# Patient Record
Sex: FEMALE | Race: WHITE | NOT HISPANIC OR LATINO | Employment: UNEMPLOYED | URBAN - METROPOLITAN AREA
[De-identification: names, ages, dates, MRNs, and addresses within clinical notes are randomized per-mention and may not be internally consistent; named-entity substitution may affect disease eponyms.]

---

## 2021-04-18 ENCOUNTER — APPOINTMENT (OUTPATIENT)
Dept: RADIOLOGY | Facility: MEDICAL CENTER | Age: 61
DRG: 918 | End: 2021-04-18
Attending: EMERGENCY MEDICINE
Payer: COMMERCIAL

## 2021-04-18 ENCOUNTER — HOSPITAL ENCOUNTER (INPATIENT)
Facility: MEDICAL CENTER | Age: 61
LOS: 4 days | DRG: 918 | End: 2021-04-22
Attending: EMERGENCY MEDICINE | Admitting: STUDENT IN AN ORGANIZED HEALTH CARE EDUCATION/TRAINING PROGRAM
Payer: COMMERCIAL

## 2021-04-18 DIAGNOSIS — D61.818 PANCYTOPENIA (HCC): ICD-10-CM

## 2021-04-18 DIAGNOSIS — T46.5X2A CLONIDINE OVERDOSE, INTENTIONAL SELF-HARM, INITIAL ENCOUNTER (HCC): ICD-10-CM

## 2021-04-18 DIAGNOSIS — F10.920 ALCOHOLIC INTOXICATION WITHOUT COMPLICATION (HCC): ICD-10-CM

## 2021-04-18 LAB
ALBUMIN SERPL BCP-MCNC: 3.8 G/DL (ref 3.2–4.9)
ALBUMIN/GLOB SERPL: 1.2 G/DL
ALP SERPL-CCNC: 94 U/L (ref 30–99)
ALT SERPL-CCNC: 20 U/L (ref 2–50)
AMPHET UR QL SCN: NEGATIVE
ANION GAP SERPL CALC-SCNC: 11 MMOL/L (ref 7–16)
APAP SERPL-MCNC: <5 UG/ML (ref 10–30)
AST SERPL-CCNC: 55 U/L (ref 12–45)
BARBITURATES UR QL SCN: NEGATIVE
BASOPHILS # BLD AUTO: 0.9 % (ref 0–1.8)
BASOPHILS # BLD: 0.02 K/UL (ref 0–0.12)
BENZODIAZ UR QL SCN: POSITIVE
BILIRUB SERPL-MCNC: 0.6 MG/DL (ref 0.1–1.5)
BUN SERPL-MCNC: 10 MG/DL (ref 8–22)
BZE UR QL SCN: NEGATIVE
CALCIUM SERPL-MCNC: 8.7 MG/DL (ref 8.5–10.5)
CANNABINOIDS UR QL SCN: NEGATIVE
CHLORIDE SERPL-SCNC: 106 MMOL/L (ref 96–112)
CO2 SERPL-SCNC: 25 MMOL/L (ref 20–33)
CREAT SERPL-MCNC: 0.55 MG/DL (ref 0.5–1.4)
EKG IMPRESSION: NORMAL
EOSINOPHIL # BLD AUTO: 0.05 K/UL (ref 0–0.51)
EOSINOPHIL NFR BLD: 2.3 % (ref 0–6.9)
ERYTHROCYTE [DISTWIDTH] IN BLOOD BY AUTOMATED COUNT: 52.3 FL (ref 35.9–50)
ETHANOL BLD-MCNC: 197.8 MG/DL (ref 0–10)
GLOBULIN SER CALC-MCNC: 3.3 G/DL (ref 1.9–3.5)
GLUCOSE SERPL-MCNC: 129 MG/DL (ref 65–99)
HCT VFR BLD AUTO: 29.8 % (ref 37–47)
HGB BLD-MCNC: 9.3 G/DL (ref 12–16)
IMM GRANULOCYTES # BLD AUTO: 0.01 K/UL (ref 0–0.11)
IMM GRANULOCYTES NFR BLD AUTO: 0.5 % (ref 0–0.9)
LIPASE SERPL-CCNC: 67 U/L (ref 11–82)
LYMPHOCYTES # BLD AUTO: 0.6 K/UL (ref 1–4.8)
LYMPHOCYTES NFR BLD: 27.6 % (ref 22–41)
MAGNESIUM SERPL-MCNC: 1.7 MG/DL (ref 1.5–2.5)
MCH RBC QN AUTO: 27.5 PG (ref 27–33)
MCHC RBC AUTO-ENTMCNC: 31.2 G/DL (ref 33.6–35)
MCV RBC AUTO: 88.2 FL (ref 81.4–97.8)
METHADONE UR QL SCN: NEGATIVE
MONOCYTES # BLD AUTO: 0.22 K/UL (ref 0–0.85)
MONOCYTES NFR BLD AUTO: 10.1 % (ref 0–13.4)
NEUTROPHILS # BLD AUTO: 1.27 K/UL (ref 2–7.15)
NEUTROPHILS NFR BLD: 58.6 % (ref 44–72)
NRBC # BLD AUTO: 0 K/UL
NRBC BLD-RTO: 0 /100 WBC
OPIATES UR QL SCN: NEGATIVE
OXYCODONE UR QL SCN: NEGATIVE
PCP UR QL SCN: NEGATIVE
PLATELET # BLD AUTO: 108 K/UL (ref 164–446)
PMV BLD AUTO: 10.8 FL (ref 9–12.9)
POTASSIUM SERPL-SCNC: 3.5 MMOL/L (ref 3.6–5.5)
PROPOXYPH UR QL SCN: NEGATIVE
PROT SERPL-MCNC: 7.1 G/DL (ref 6–8.2)
RBC # BLD AUTO: 3.38 M/UL (ref 4.2–5.4)
SALICYLATES SERPL-MCNC: <1 MG/DL (ref 15–25)
SARS-COV-2 RNA RESP QL NAA+PROBE: NOTDETECTED
SODIUM SERPL-SCNC: 142 MMOL/L (ref 135–145)
SPECIMEN SOURCE: NORMAL
TSH SERPL DL<=0.005 MIU/L-ACNC: 2.59 UIU/ML (ref 0.38–5.33)
WBC # BLD AUTO: 2.2 K/UL (ref 4.8–10.8)

## 2021-04-18 PROCEDURE — 99291 CRITICAL CARE FIRST HOUR: CPT | Performed by: EMERGENCY MEDICINE

## 2021-04-18 PROCEDURE — A9270 NON-COVERED ITEM OR SERVICE: HCPCS | Performed by: STUDENT IN AN ORGANIZED HEALTH CARE EDUCATION/TRAINING PROGRAM

## 2021-04-18 PROCEDURE — 80179 DRUG ASSAY SALICYLATE: CPT

## 2021-04-18 PROCEDURE — U0005 INFEC AGEN DETEC AMPLI PROBE: HCPCS

## 2021-04-18 PROCEDURE — 82077 ASSAY SPEC XCP UR&BREATH IA: CPT

## 2021-04-18 PROCEDURE — 99291 CRITICAL CARE FIRST HOUR: CPT

## 2021-04-18 PROCEDURE — 700105 HCHG RX REV CODE 258: Performed by: STUDENT IN AN ORGANIZED HEALTH CARE EDUCATION/TRAINING PROGRAM

## 2021-04-18 PROCEDURE — 770022 HCHG ROOM/CARE - ICU (200)

## 2021-04-18 PROCEDURE — 83735 ASSAY OF MAGNESIUM: CPT

## 2021-04-18 PROCEDURE — 700101 HCHG RX REV CODE 250: Performed by: INTERNAL MEDICINE

## 2021-04-18 PROCEDURE — 99292 CRITICAL CARE ADDL 30 MIN: CPT | Performed by: INTERNAL MEDICINE

## 2021-04-18 PROCEDURE — 96374 THER/PROPH/DIAG INJ IV PUSH: CPT

## 2021-04-18 PROCEDURE — 71045 X-RAY EXAM CHEST 1 VIEW: CPT

## 2021-04-18 PROCEDURE — 700101 HCHG RX REV CODE 250: Performed by: EMERGENCY MEDICINE

## 2021-04-18 PROCEDURE — 94664 DEMO&/EVAL PT USE INHALER: CPT

## 2021-04-18 PROCEDURE — 99406 BEHAV CHNG SMOKING 3-10 MIN: CPT

## 2021-04-18 PROCEDURE — U0003 INFECTIOUS AGENT DETECTION BY NUCLEIC ACID (DNA OR RNA); SEVERE ACUTE RESPIRATORY SYNDROME CORONAVIRUS 2 (SARS-COV-2) (CORONAVIRUS DISEASE [COVID-19]), AMPLIFIED PROBE TECHNIQUE, MAKING USE OF HIGH THROUGHPUT TECHNOLOGIES AS DESCRIBED BY CMS-2020-01-R: HCPCS

## 2021-04-18 PROCEDURE — 80143 DRUG ASSAY ACETAMINOPHEN: CPT

## 2021-04-18 PROCEDURE — 84443 ASSAY THYROID STIM HORMONE: CPT

## 2021-04-18 PROCEDURE — 93005 ELECTROCARDIOGRAM TRACING: CPT | Performed by: EMERGENCY MEDICINE

## 2021-04-18 PROCEDURE — 85025 COMPLETE CBC W/AUTO DIFF WBC: CPT

## 2021-04-18 PROCEDURE — 700111 HCHG RX REV CODE 636 W/ 250 OVERRIDE (IP): Performed by: STUDENT IN AN ORGANIZED HEALTH CARE EDUCATION/TRAINING PROGRAM

## 2021-04-18 PROCEDURE — 700111 HCHG RX REV CODE 636 W/ 250 OVERRIDE (IP): Performed by: INTERNAL MEDICINE

## 2021-04-18 PROCEDURE — 99223 1ST HOSP IP/OBS HIGH 75: CPT | Performed by: PSYCHIATRY & NEUROLOGY

## 2021-04-18 PROCEDURE — 70450 CT HEAD/BRAIN W/O DYE: CPT

## 2021-04-18 PROCEDURE — 700111 HCHG RX REV CODE 636 W/ 250 OVERRIDE (IP)

## 2021-04-18 PROCEDURE — HZ2ZZZZ DETOXIFICATION SERVICES FOR SUBSTANCE ABUSE TREATMENT: ICD-10-PCS | Performed by: STUDENT IN AN ORGANIZED HEALTH CARE EDUCATION/TRAINING PROGRAM

## 2021-04-18 PROCEDURE — 80307 DRUG TEST PRSMV CHEM ANLYZR: CPT

## 2021-04-18 PROCEDURE — 700102 HCHG RX REV CODE 250 W/ 637 OVERRIDE(OP): Performed by: STUDENT IN AN ORGANIZED HEALTH CARE EDUCATION/TRAINING PROGRAM

## 2021-04-18 PROCEDURE — 99233 SBSQ HOSP IP/OBS HIGH 50: CPT | Performed by: STUDENT IN AN ORGANIZED HEALTH CARE EDUCATION/TRAINING PROGRAM

## 2021-04-18 PROCEDURE — 80053 COMPREHEN METABOLIC PANEL: CPT

## 2021-04-18 PROCEDURE — 83690 ASSAY OF LIPASE: CPT

## 2021-04-18 PROCEDURE — 51798 US URINE CAPACITY MEASURE: CPT

## 2021-04-18 RX ORDER — HYDRALAZINE HYDROCHLORIDE 20 MG/ML
20 INJECTION INTRAMUSCULAR; INTRAVENOUS EVERY 6 HOURS PRN
Status: DISCONTINUED | OUTPATIENT
Start: 2021-04-18 | End: 2021-04-22 | Stop reason: HOSPADM

## 2021-04-18 RX ORDER — GAUZE BANDAGE 2" X 2"
100 BANDAGE TOPICAL DAILY
Status: COMPLETED | OUTPATIENT
Start: 2021-04-19 | End: 2021-04-22

## 2021-04-18 RX ORDER — LABETALOL HYDROCHLORIDE 5 MG/ML
10 INJECTION, SOLUTION INTRAVENOUS EVERY 6 HOURS PRN
Status: DISCONTINUED | OUTPATIENT
Start: 2021-04-18 | End: 2021-04-22 | Stop reason: HOSPADM

## 2021-04-18 RX ORDER — LORAZEPAM 2 MG/ML
0.5 INJECTION INTRAMUSCULAR EVERY 4 HOURS
Status: DISCONTINUED | OUTPATIENT
Start: 2021-04-18 | End: 2021-04-19

## 2021-04-18 RX ORDER — SODIUM CHLORIDE, SODIUM LACTATE, POTASSIUM CHLORIDE, AND CALCIUM CHLORIDE .6; .31; .03; .02 G/100ML; G/100ML; G/100ML; G/100ML
1000 INJECTION, SOLUTION INTRAVENOUS ONCE
Status: COMPLETED | OUTPATIENT
Start: 2021-04-18 | End: 2021-04-18

## 2021-04-18 RX ORDER — SODIUM CHLORIDE, SODIUM LACTATE, POTASSIUM CHLORIDE, CALCIUM CHLORIDE 600; 310; 30; 20 MG/100ML; MG/100ML; MG/100ML; MG/100ML
INJECTION, SOLUTION INTRAVENOUS CONTINUOUS
Status: DISCONTINUED | OUTPATIENT
Start: 2021-04-18 | End: 2021-04-20

## 2021-04-18 RX ORDER — NALOXONE HYDROCHLORIDE 0.4 MG/ML
0.4 INJECTION, SOLUTION INTRAMUSCULAR; INTRAVENOUS; SUBCUTANEOUS ONCE
Status: COMPLETED | OUTPATIENT
Start: 2021-04-18 | End: 2021-04-18

## 2021-04-18 RX ORDER — FOLIC ACID 1 MG/1
1 TABLET ORAL DAILY
Status: COMPLETED | OUTPATIENT
Start: 2021-04-19 | End: 2021-04-22

## 2021-04-18 RX ORDER — LORAZEPAM 2 MG/ML
1-2 INJECTION INTRAMUSCULAR
Status: DISCONTINUED | OUTPATIENT
Start: 2021-04-18 | End: 2021-04-19

## 2021-04-18 RX ORDER — POLYETHYLENE GLYCOL 3350 17 G/17G
1 POWDER, FOR SOLUTION ORAL
Status: DISCONTINUED | OUTPATIENT
Start: 2021-04-18 | End: 2021-04-22 | Stop reason: HOSPADM

## 2021-04-18 RX ORDER — BISACODYL 10 MG
10 SUPPOSITORY, RECTAL RECTAL
Status: DISCONTINUED | OUTPATIENT
Start: 2021-04-18 | End: 2021-04-22 | Stop reason: HOSPADM

## 2021-04-18 RX ORDER — NALOXONE HYDROCHLORIDE 0.4 MG/ML
INJECTION, SOLUTION INTRAMUSCULAR; INTRAVENOUS; SUBCUTANEOUS
Status: COMPLETED
Start: 2021-04-18 | End: 2021-04-18

## 2021-04-18 RX ORDER — ACETAMINOPHEN 325 MG/1
650 TABLET ORAL EVERY 6 HOURS PRN
Status: DISCONTINUED | OUTPATIENT
Start: 2021-04-18 | End: 2021-04-22 | Stop reason: HOSPADM

## 2021-04-18 RX ORDER — AMOXICILLIN 250 MG
2 CAPSULE ORAL 2 TIMES DAILY
Status: DISCONTINUED | OUTPATIENT
Start: 2021-04-18 | End: 2021-04-22 | Stop reason: HOSPADM

## 2021-04-18 RX ORDER — POTASSIUM CHLORIDE 20 MEQ/1
40 TABLET, EXTENDED RELEASE ORAL EVERY 6 HOURS
Status: COMPLETED | OUTPATIENT
Start: 2021-04-18 | End: 2021-04-18

## 2021-04-18 RX ADMIN — SODIUM CHLORIDE, POTASSIUM CHLORIDE, SODIUM LACTATE AND CALCIUM CHLORIDE 125 ML: 600; 310; 30; 20 INJECTION, SOLUTION INTRAVENOUS at 04:36

## 2021-04-18 RX ADMIN — LORAZEPAM 1 MG: 2 INJECTION INTRAMUSCULAR; INTRAVENOUS at 20:26

## 2021-04-18 RX ADMIN — SODIUM CHLORIDE, POTASSIUM CHLORIDE, SODIUM LACTATE AND CALCIUM CHLORIDE 1000 ML: 600; 310; 30; 20 INJECTION, SOLUTION INTRAVENOUS at 03:33

## 2021-04-18 RX ADMIN — NALOXONE HYDROCHLORIDE 0.4 MG: 0.4 INJECTION, SOLUTION INTRAMUSCULAR; INTRAVENOUS; SUBCUTANEOUS at 01:01

## 2021-04-18 RX ADMIN — ENOXAPARIN SODIUM 40 MG: 40 INJECTION SUBCUTANEOUS at 05:02

## 2021-04-18 RX ADMIN — SODIUM CHLORIDE, POTASSIUM CHLORIDE, SODIUM LACTATE AND CALCIUM CHLORIDE: 600; 310; 30; 20 INJECTION, SOLUTION INTRAVENOUS at 20:43

## 2021-04-18 RX ADMIN — THIAMINE HYDROCHLORIDE: 100 INJECTION, SOLUTION INTRAMUSCULAR; INTRAVENOUS at 05:50

## 2021-04-18 RX ADMIN — POTASSIUM CHLORIDE 40 MEQ: 1500 TABLET, EXTENDED RELEASE ORAL at 05:02

## 2021-04-18 RX ADMIN — LORAZEPAM 0.5 MG: 2 INJECTION INTRAMUSCULAR; INTRAVENOUS at 10:42

## 2021-04-18 RX ADMIN — LABETALOL HYDROCHLORIDE 10 MG: 5 INJECTION, SOLUTION INTRAVENOUS at 17:43

## 2021-04-18 RX ADMIN — ACETAMINOPHEN 650 MG: 325 TABLET, FILM COATED ORAL at 16:10

## 2021-04-18 RX ADMIN — LORAZEPAM 0.5 MG: 2 INJECTION INTRAMUSCULAR; INTRAVENOUS at 14:00

## 2021-04-18 RX ADMIN — LORAZEPAM 1 MG: 2 INJECTION INTRAMUSCULAR; INTRAVENOUS at 16:09

## 2021-04-18 RX ADMIN — POTASSIUM CHLORIDE 40 MEQ: 1500 TABLET, EXTENDED RELEASE ORAL at 14:00

## 2021-04-18 ASSESSMENT — ENCOUNTER SYMPTOMS
PALPITATIONS: 0
CONSTIPATION: 0
COUGH: 0
RESPIRATORY NEGATIVE: 1
BLURRED VISION: 1
SHORTNESS OF BREATH: 0
WEIGHT LOSS: 1
WEAKNESS: 0
HEARTBURN: 1
DOUBLE VISION: 0
VOMITING: 0
NAUSEA: 0
MEMORY LOSS: 0
ABDOMINAL PAIN: 0
MYALGIAS: 0
MUSCULOSKELETAL NEGATIVE: 1
WEIGHT LOSS: 0
BLURRED VISION: 0
SORE THROAT: 0
NERVOUS/ANXIOUS: 1
DEPRESSION: 1
CHILLS: 0
DIZZINESS: 0
FALLS: 0
DIARRHEA: 1
INSOMNIA: 1
FEVER: 0
HEADACHES: 0
SENSORY CHANGE: 0
CARDIOVASCULAR NEGATIVE: 1
EYES NEGATIVE: 1
FOCAL WEAKNESS: 0
TREMORS: 1
GASTROINTESTINAL NEGATIVE: 1
STRIDOR: 0
NEUROLOGICAL NEGATIVE: 1
SPUTUM PRODUCTION: 0
EYE REDNESS: 1

## 2021-04-18 ASSESSMENT — LIFESTYLE VARIABLES: SUBSTANCE_ABUSE: 1

## 2021-04-18 ASSESSMENT — FIBROSIS 4 INDEX: FIB4 SCORE: 7.06

## 2021-04-18 ASSESSMENT — PAIN DESCRIPTION - PAIN TYPE: TYPE: ACUTE PAIN

## 2021-04-18 NOTE — ASSESSMENT & PLAN NOTE
Suicide attempt via ingestion of clonidine 0.1 mg (10-15 tablets)  BP has been stable and normal thus far.   Monitor for hypotension   May consider narcan infusion if she gets worse  Supportive care

## 2021-04-18 NOTE — PROGRESS NOTES
2 RN Skin Assessment Completed by Michelle VELA RN and Chelsea TANNER RN.    Head: WDL  Ears: redness Blanching  Nose: WDL  Mouth: WDL  Neck: WDL  Breasts/Chest: WDL  Shoulder Blades: WDL  Spine: redness and blanching  (R) Arm/Elbow/hand: bruising  (L) Arm/Elbow/hand: bruising  Abdomen:bruising  Groin: WDL  Sacrum/Coccyx/Buttocks: redness and blanching  (R) Leg: bruising discoloration  (L) Leg: bruising discoloration  (R) Heel/Foot/Toe: discoloration and boggy  (L) Heel/Foot/Toe: discoloration and boggy          Devices in place: BP Cuff and Pulse Ox purewic    Interventions in place: NC with ear foams, Sacral Mepilex, Pillows, Q2 turns, Low air loss mattress , Barrier Cream and Heels floated with pillows    Possible skin injury found: No    Pictures uploaded into Epic: N/A  Wound Consult Placed: N/A

## 2021-04-18 NOTE — ASSESSMENT & PLAN NOTE
As per history has history of scoliosis that affects her ambulation.  lidoderm patch added 4/20  Continue to monitor.  Consider PT and OT consults.

## 2021-04-18 NOTE — ASSESSMENT & PLAN NOTE
History of hypertension.    S/p hypotension from clonidine overdose  Has prn labetalol and hydralazine  If becomes more consistant initiate other oral BP med  monitor vitals.

## 2021-04-18 NOTE — ASSESSMENT & PLAN NOTE
Likely secondary to bone marrow suppression in setting of chronic alcoholism.  Question of liver disease in conjunction, check hepatitis panel  No current signs of infection.  Continue to monitor closely.

## 2021-04-18 NOTE — H&P
"Hospital Medicine History & Physical Note    Date of Service  4/18/2021    Primary Care Physician  No primary care provider on file.    Consultants  Critical Care-Dr. Cat    Code Status  Full Code    Chief Complaint  Chief Complaint   Patient presents with   • ALOC     Possible OD on Clonidine and ETOH per son   • Suicidal Ideation       History of Presenting Illness  61 y.o. female who presented 4/18/2021 with intentional suicide attempt through clonidine overdose.  This is a pleasant woman with a history of alcohol dependence with cirrhosis, COPD not on home oxygen, GERD, heart disease unspecified, hypertension, and scoliosis.  She reports having gone through detox from heroin and methamphetamine 2 months ago but was frustrated with her continued use of alcohol.  She reports having gone through 35 treatment centers between [place] and other [facility], in which she has lived.  She texted her son that she was going to \"end it all\" and took approximately 10 tablets of clonidine while she was drinking.  She reports she did \"something stupid\" but was a intentional attempt at suicide.  She currently reports regretting her atttempt.  She reports that she obtained the clonidine from a recent detoxification and that she was discharged with it.  She also reports history of persistent depression with anxiety.    She was brought in by Medicare 1 was given 1 L of IVFs was Levophed drip on route.  She was noted to be alert and oriented x1 on arrival by EMS.  On arrival here, her blood pressure was 105/63 but continued to trend down to the 80s over 50s becoming more somnolent.  She was hypothermic with a temperature of 95.4.  Blood alcohol was 197.8, negative acetaminophen and salicylates.  Potassium mildly low at 3.5.      Review of Systems  Review of Systems   Constitutional: Negative for chills and fever.   HENT: Negative for ear pain and sore throat.    Eyes: Positive for blurred vision and redness. Negative for double " vision.   Respiratory: Negative for cough and shortness of breath.    Cardiovascular: Negative for chest pain and palpitations.   Gastrointestinal: Positive for diarrhea and heartburn. Negative for abdominal pain, constipation, nausea and vomiting.   Genitourinary: Negative for dysuria and frequency.   Musculoskeletal: Negative for joint pain and myalgias.   Skin: Negative for itching and rash.   Neurological: Negative for dizziness, weakness and headaches.   Psychiatric/Behavioral: Positive for depression, substance abuse (Alcohol) and suicidal ideas. Negative for memory loss. The patient is nervous/anxious and has insomnia.        Past Medical History   has a past medical history of Cirrhosis with alcoholism (HCC), COPD (chronic obstructive pulmonary disease) (HCC), GERD (gastroesophageal reflux disease), Heart disease, Hypertension, and Scoliosis.    Surgical History   has a past surgical history that includes cholecystectomy and primary c section.     Family History  family history includes COPD in her mother; Heart Disease in her father; Kidney Disease in her father.     Social History   reports that she quit smoking about 22 years ago. Her smoking use included cigarettes. She has a 26.25 pack-year smoking history. She has never used smokeless tobacco. She reports current alcohol use. She reports previous drug use.    Allergies  Not on File    Medications  None       Physical Exam  Temp:  [35.2 °C (95.4 °F)] 35.2 °C (95.4 °F)  Pulse:  [55-67] 67  Resp:  [12-20] 14  BP: ()/(51-63) 99/57  SpO2:  [90 %-100 %] 97 %    Physical Exam    Laboratory:  Recent Labs     04/18/21 0205   WBC 2.2*   RBC 3.38*   HEMOGLOBIN 9.3*   HEMATOCRIT 29.8*   MCV 88.2   MCH 27.5   MCHC 31.2*   RDW 52.3*   PLATELETCT 108*   MPV 10.8     Recent Labs     04/18/21  0205   SODIUM 142   POTASSIUM 3.5*   CHLORIDE 106   CO2 25   GLUCOSE 129*   BUN 10   CREATININE 0.55   CALCIUM 8.7     Recent Labs     04/18/21 0205   ALTSGPT 20      ASTSGOT 55*   ALKPHOSPHAT 94   TBILIRUBIN 0.6   LIPASE 67   GLUCOSE 129*         No results for input(s): NTPROBNP in the last 72 hours.      No results for input(s): TROPONINT in the last 72 hours.    Imaging:  CT-HEAD W/O   Final Result         1.  No acute intracranial abnormality is identified, there are nonspecific white matter changes, commonly associated with small vessel ischemic disease.  Associated mild cerebral atrophy is noted.   2.  Hyperdensity in the posterior oropharynx, likely ingested material. Both   3.  Atherosclerosis.      DX-CHEST-PORTABLE (1 VIEW)   Final Result         1.  Interstitial pulmonary parenchymal prominence suggest chronic underlying lung disease, component of interstitial edema and/or infiltrates not excluded.   2.  Cardiomegaly   3.  Atherosclerosis          I have personally reviewed the patient's head CT.  No intracranial hemorrhage or masses.  Possible pill or object in her oropharynx.      I have personally reviewed the patient's chest x-ray.  Per my read fine interstitial infiltrates on the right.  Sharp costophrenic angles bilaterally.  No focal infiltrates.  Mild cardiomegaly.        EKG per my read shows sinus tachycardia with heart rate of 456, QTc 486, no significant ST elevation or depression.    Assessment/Plan:  I anticipate this patient will require at least two midnights for appropriate medical management, necessitating inpatient admission.    * Hypotension due to drugs- (present on admission)  Assessment & Plan  Secondary to clonidine overdose.      I have discussed the case with the ER physician, Dr. Driver, as well as Dr. Cat of Critical Care, who is currently accepted the patient for the ICU.  Patient is critically ill due to her unstable blood pressures.    Admit to ICU.  Additional IV fluid bolus with LR with maintenance rate following.  Neurochecks every 4 hours.  Pressors as per critical care if indicated.    Alcohol dependence with intoxication  ([facility])- (present on admission)  Assessment & Plan  Patient continues to remain frustrated with inability to refrain from alcohol.    Counseled patient on use of supportive groups such as Alcoholics Anonymous.    Suicide attempt by drug overdose ([facility])- (present on admission)  Assessment & Plan  Patient confirmed it was a suicide attempt.    I personally placed the patient on legal hold.    Clonidine overdose- (present on admission)  Assessment & Plan  Ms. Aceves was here with a confirmed overdose of clonidine; she has no other known overdoses.      Continue supportive care in ICU.    Acquired pancytopenia ([facility])- (present on admission)  Assessment & Plan  Likely secondary to bone marrow suppression in setting of chronic alcoholism.    No current signs of infection.  Continue to monitor closely.    Hypokalemia- (present on admission)  Assessment & Plan  Replace for potassium goal greater than 4.0.  Magnesium goal greater than 2.0.    Hypertension- (present on admission)  Assessment & Plan  History of hypertension.  Currently hypotensive due to clonidine overdose.    Cirrhosis with alcoholism ([facility])- (present on admission)  Assessment & Plan  As per history due to continued alcoholism.    Patient counseled on alcohol cessation as per above.    Heart disease- (present on admission)  Assessment & Plan  Unspecified heart disease history per patient.  Mild cardiomegaly present on chest x-ray.  No signs of acute heart failure.    Continue to monitor.  Consider echocardiogram.    COPD (chronic obstructive pulmonary disease) ([facility])- (present on admission)  Assessment & Plan  Currently stable not on inhalers or oxygen.  Quit smoking 12 years ago.    Continue to monitor.    GERD (gastroesophageal reflux disease)- (present on admission)  Assessment & Plan  As per history he has history of GERD.  No current symptoms.    Continue to monitor.    Scoliosis- (present on admission)  Assessment & Plan  As per history has history of  scoliosis that affects her ambulation.    Continue to monitor.  Consider PT and OT consults.

## 2021-04-18 NOTE — ED PROVIDER NOTES
ED Provider Note    CHIEF COMPLAINT  Chief Complaint   Patient presents with   • ALOC     Possible OD on Clonidine and ETOH per son       HPI  Kacie Aceves is a 61 y.o. female who presents to the emergency department after intentional overdose. Patient admits to daily alcohol abuse. Tonight took approximately 10 to 15 tablets of her clonidine with attempt to kill self. Denies any other current complaints. Denies prior history the same. Ingestion roughly around 8 PM.    EMS states the patient was significantly somnolent and also hypotensive and bradycardia. She started on levo. Prior to arrival with slight improvement of vital signs.    REVIEW OF SYSTEMS  See HPI for further details. All other systems are negative.     PAST MEDICAL HISTORY       SOCIAL HISTORY  Social History     Tobacco Use   • Smoking status: Not on file   Substance and Sexual Activity   • Alcohol use: Not on file   • Drug use: Not on file   • Sexual activity: Not on file       SURGICAL HISTORY  patient denies any surgical history    CURRENT MEDICATIONS  Home Medications    **Home medications have not yet been reviewed for this encounter**         ALLERGIES  Not on File    PHYSICAL EXAM  VITAL SIGNS: There were no vitals taken for this visit. @[facility][[id number]::@   Pulse ox interpretation: I interpret this pulse ox as normal.  Constitutional: Alert in no apparent distress.  HENT: No signs of trauma, Bilateral external ears normal, Nose normal.   Eyes: Pupils are pinpoint  Neck: Normal range of motion, No tenderness, Supple, No stridor.   Lymphatic: No lymphadenopathy noted.   Cardiovascular: Regular rate and rhythm, no murmurs.   Thorax & Lungs: Normal breath sounds, No respiratory distress, No wheezing, No chest tenderness.   Abdomen: Bowel sounds normal, Soft, No tenderness  Skin: Warm, Dry, No erythema, No rash.    Extremities: Intact distal pulses, No edema, No tenderness  Musculoskeletal: Good range of motion in all major joints. No tenderness to  palpation or major deformities noted.   Neurologic:  somnolent, arouses to voice, lateral nystagmus,   Psychiatric: suicidal with attempt      DIAGNOSTIC STUDIES / PROCEDURES        LABS  Results for orders placed or performed during the hospital encounter of 04/18/21   URINE DRUG SCREEN (TRIAGE)   Result Value Ref Range    Amphetamines Urine Negative Negative    Barbiturates Negative Negative    Benzodiazepines Positive (A) Negative    Cocaine Metabolite Negative Negative    Methadone Negative Negative    Opiates Negative Negative    Oxycodone Negative Negative    Phencyclidine -Pcp Negative Negative    Propoxyphene Negative Negative    Cannabinoid Metab Negative Negative   Blood Alcohol   Result Value Ref Range    Diagnostic Alcohol 197.8 (H) 0.0 - 10.0 mg/dL   CBC WITH DIFFERENTIAL   Result Value Ref Range    WBC 2.2 (LL) 4.8 - 10.8 K/uL    RBC 3.38 (L) 4.20 - 5.40 M/uL    Hemoglobin 9.3 (L) 12.0 - 16.0 g/dL    Hematocrit 29.8 (L) 37.0 - 47.0 %    MCV 88.2 81.4 - 97.8 fL    MCH 27.5 27.0 - 33.0 pg    MCHC 31.2 (L) 33.6 - 35.0 g/dL    RDW 52.3 (H) 35.9 - 50.0 fL    Platelet Count 108 (L) 164 - 446 K/uL    MPV 10.8 9.0 - 12.9 fL    Neutrophils-Polys 58.60 44.00 - 72.00 %    Lymphocytes 27.60 22.00 - 41.00 %    Monocytes 10.10 0.00 - 13.40 %    Eosinophils 2.30 0.00 - 6.90 %    Basophils 0.90 0.00 - 1.80 %    Immature Granulocytes 0.50 0.00 - 0.90 %    Nucleated RBC 0.00 /100 WBC    Neutrophils (Absolute) 1.27 (L) 2.00 - 7.15 K/uL    Lymphs (Absolute) 0.60 (L) 1.00 - 4.80 K/uL    Monos (Absolute) 0.22 0.00 - 0.85 K/uL    Eos (Absolute) 0.05 0.00 - 0.51 K/uL    Baso (Absolute) 0.02 0.00 - 0.12 K/uL    Immature Granulocytes (abs) 0.01 0.00 - 0.11 K/uL    NRBC (Absolute) 0.00 K/uL   COMP METABOLIC PANEL   Result Value Ref Range    Sodium 142 135 - 145 mmol/L    Potassium 3.5 (L) 3.6 - 5.5 mmol/L    Chloride 106 96 - 112 mmol/L    Co2 25 20 - 33 mmol/L    Anion Gap 11.0 7.0 - 16.0    Glucose 129 (H) 65 - 99 mg/dL    Bun  10 8 - 22 mg/dL    Creatinine 0.55 0.50 - 1.40 mg/dL    Calcium 8.7 8.5 - 10.5 mg/dL    AST(SGOT) 55 (H) 12 - 45 U/L    ALT(SGPT) 20 2 - 50 U/L    Alkaline Phosphatase 94 30 - 99 U/L    Total Bilirubin 0.6 0.1 - 1.5 mg/dL    Albumin 3.8 3.2 - 4.9 g/dL    Total Protein 7.1 6.0 - 8.2 g/dL    Globulin 3.3 1.9 - 3.5 g/dL    A-G Ratio 1.2 g/dL   LIPASE   Result Value Ref Range    Lipase 67 11 - 82 U/L   Acetaminophen Level   Result Value Ref Range    Acetaminophen -Tylenol <5 (L) 10 - 30 ug/mL   SALICYLATE LEVEL   Result Value Ref Range    Salicylates, Quant. <1 (L) 15 - 25 mg/dL   SARS-CoV-2 PCR (24 hour In-House): Collect NP swab in VTM    Specimen: Nasopharyngeal; Respirate   Result Value Ref Range    SARS-CoV-2 Source NP Swab    ESTIMATED GFR   Result Value Ref Range    GFR If African American >60 >60 mL/min/1.73 m 2    GFR If Non African American >60 >60 mL/min/1.73 m 2   MAGNESIUM   Result Value Ref Range    Magnesium 1.7 1.5 - 2.5 mg/dL   EKG (NOW)   Result Value Ref Range    Report       [facility] Emergency Dept.    Test Date:  2021  Pt Name:    RAHEEM ADLY              Department: ER  MRN:        [id number]                      Room:        04  Gender:     Female                       Technician: [id number]  :        1960                   Requested By:KAITLYNN BURKETT  Order #:    [id number]                    Reading MD:    Measurements  Intervals                                Axis  Rate:       56                           P:  IA:                                      QRS:        27  QRSD:       84                           T:          -8  QT:         486  QTc:        470    Interpretive Statements  JUNCTIONAL ESCAPE RHYTHM  BORDERLINE T ABNORMALITIES, INFERIOR LEADS  ST ELEVATION SUGGESTS PERICARDITIS  No previous ECG available for comparison           RADIOLOGY  CT-HEAD W/O   Final Result         1.  No acute intracranial abnormality is identified, there are nonspecific white  matter changes, commonly associated with small vessel ischemic disease.  Associated mild cerebral atrophy is noted.   2.  Hyperdensity in the posterior oropharynx, likely ingested material. Both   3.  Atherosclerosis.      DX-CHEST-PORTABLE (1 VIEW)   Final Result         1.  Interstitial pulmonary parenchymal prominence suggest chronic underlying lung disease, component of interstitial edema and/or infiltrates not excluded.   2.  Cardiomegaly   3.  Atherosclerosis        CRITICAL CARE  The very real possibilty of a deterioration of this patient's condition required the highest level of my preparedness for sudden, emergent intervention.  I provided critical care services, which included medication orders, frequent reevaluations of the patient's condition and response to treatment, ordering and reviewing test results, and discussing the case with various consultants.  The critical care time associated with the care of the patient was 35 minutes. Review chart for interventions. This time is exclusive of any other billable procedures.         COURSE & MEDICAL DECISION MAKING  Pertinent Labs & Imaging studies reviewed. (See chart for details)    patient presented the emergency department after intentional overdose on clonidine. Chronic alcohol abuse. Patient is somnolent. Pinpoint pupils. No response with Narcan. Screening EKG and blood sugars without acute abnormalities. Give difficulty with history and pinpoint pupils additional head CT was obtained and negative. Patient is hemodynamically stable. Slightly bradycardia. Systolic in the 90s. Arousal. Protecting airway. Good gag. I discussed case with hospitals and intensivist which will continue ongoing inpatient care.      FINAL IMPRESSION  clonidine overdose    chronic alcohol abuse and dependence suicidal attempt    Pancytopenia       Electronically signed by: Toñito Driver M.D., 4/18/2021 1:10 AM

## 2021-04-18 NOTE — ED TRIAGE NOTES
Chief Complaint   Patient presents with   • ALOC     Possible OD on Clonidine and ETOH per son     Pt texted son stating she was going to kill herself by overdosing on Clonidine and alcohol. [facility] A&Ox[facility] to self and minimally responsive. Administered 1L IVF and Levo gtt at 2.

## 2021-04-18 NOTE — ASSESSMENT & PLAN NOTE
Uses EtOH daily, last dose was prior to admission  Pt showing some mild EtOH withdrawal symptoms  On ativan 0.5 IV Q4H scheduled and 1-2mg IV prn  Fluids  Vitamin supplementation

## 2021-04-18 NOTE — ASSESSMENT & PLAN NOTE
Ms. Aceves was here with a confirmed overdose of clonidine; she has no other known overdoses.    The patient later states that she might of taken trazodone as well, unclear, monitor  Psychiatry consulting  Legal hold.  Watch for rebound hypertension

## 2021-04-18 NOTE — ASSESSMENT & PLAN NOTE
Ingestion of clonidine and alcohol  Legal hold  1:1 taina  Psychiatry consulted  Social work consult

## 2021-04-18 NOTE — CONSULTS
Critical Care Consultation    Date of consult: 4/18/2021    Referring Physician  Maurilio Matos M.D.    Reason for Consultation  Intentional clonidine overdose and suicide attempt    History of Presenting Illness  61 y.o. female who presented 4/18/2021 with after intentional overdose with clonidine (apparently took 10 to 15 tablets) in a suicide attempt as she states that she is an alcoholic and has been unable to stop drinking and she feels like she is a burden to her children.  Patient states that this is her first suicide attempt.  She denies any acute event that triggered this.  She endorses congestion with alcohol, but denies any additional medication ingestion.    In the ED the patient has remained hemodynamically stable.  I was consulted for ICU admission along his further evaluation and treatment.    Code Status  Full Code    Review of Systems  Review of Systems   Constitutional: Negative for weight loss.   HENT: Negative for sore throat.    Respiratory: Negative for shortness of breath.    Cardiovascular: Negative for chest pain.   Gastrointestinal: Negative for abdominal pain and nausea.   Musculoskeletal: Negative for falls.   Skin: Negative for itching and rash.   Neurological: Negative for headaches.   Psychiatric/Behavioral: Positive for depression and suicidal ideas. The patient is nervous/anxious.    All other systems reviewed and are negative.      Past Medical History   has a past medical history of Cirrhosis with alcoholism (HCC), COPD (chronic obstructive pulmonary disease) (HCC), GERD (gastroesophageal reflux disease), Heart disease, Hypertension, and Scoliosis.    Surgical History   has a past surgical history that includes cholecystectomy and primary c section.    Family History  family history includes COPD in her mother; Heart Disease in her father; Kidney Disease in her father.    Social History   reports that she quit smoking about 22 years ago. Her smoking use included cigarettes. She has a  26.25 pack-year smoking history. She has never used smokeless tobacco. She reports current alcohol use. She reports previous drug use.    Medications  Home Medications    **Home medications have not yet been reviewed for this encounter**       Current Facility-Administered Medications   Medication Dose Route Frequency Provider Last Rate Last Admin   • lactated ringers infusion   Intravenous Continuous Maurilio Matos M.D. 125 mL/hr at 04/18/21 0436 125 mL at 04/18/21 0436   • enoxaparin (LOVENOX) inj 40 mg  40 mg Subcutaneous DAILY Maurilio Matos M.D.   40 mg at 04/18/21 0502   • acetaminophen (Tylenol) tablet 650 mg  650 mg Oral Q6HRS PRN Maruilio Matos M.D.       • senna-docusate (PERICOLACE or SENOKOT S) 8.6-50 MG per tablet 2 tablet  2 tablet Oral BID Maurilio Matos M.D.   Stopped at 04/18/21 0600    And   • polyethylene glycol/lytes (MIRALAX) PACKET 1 Packet  1 Packet Oral QDAY PRN Maurilio Matos M.D.        And   • magnesium hydroxide (MILK OF MAGNESIA) suspension 30 mL  30 mL Oral QDAY PRN Maurilio Matos M.D.        And   • bisacodyl (DULCOLAX) suppository 10 mg  10 mg Rectal QDAY PRN Maurilio Matos M.D.       • potassium chloride SA (Kdur) tablet 40 mEq  40 mEq Oral Q6HRS Maurilio Matos M.D.   40 mEq at 04/18/21 0502   • detox IV 1000 mL (D5LR + magnesium 1 g + thiamine 100 mg + folic acid 1 mg) infusion   Intravenous Once Gary Cat M.D.        And   • [START ON 4/19/2021] thiamine (Vitamin B-1) tablet 100 mg  100 mg Oral DAILY Gary Cat M.D.        And   • [START ON 4/19/2021] multivitamin (THERAGRAN) tablet 1 tablet  1 tablet Oral DAILY Gary Cat M.D.        And   • [START ON 4/19/2021] folic acid (FOLVITE) tablet 1 mg  1 mg Oral DAILY Gary Cat M.D.           Allergies  Not on File    Vital Signs last 24 hours  Temp:  [35.2 °C (95.4 °F)] 35.2 °C (95.4 °F)  Pulse:  [55-67] 67  Resp:  [12-20] 14  BP: ()/(51-63) 99/57  SpO2:  [90 %-100 %] 97 %    Physical Exam  Physical Exam  Vitals  and nursing note reviewed. Exam conducted with a chaperone present.   Constitutional:       General: She is not in acute distress.     Appearance: She is ill-appearing. She is not diaphoretic.   HENT:      Head: Normocephalic and atraumatic.      Right Ear: External ear normal.      Left Ear: External ear normal.      Nose: Nose normal.      Mouth/Throat:      Mouth: Mucous membranes are moist.      Pharynx: Oropharynx is clear.   Eyes:      Extraocular Movements: Extraocular movements intact.      Conjunctiva/sclera: Conjunctivae normal.      Pupils: Pupils are equal, round, and reactive to light.   Cardiovascular:      Rate and Rhythm: Normal rate and regular rhythm.      Pulses: Normal pulses.      Heart sounds: Normal heart sounds.   Pulmonary:      Effort: Pulmonary effort is normal.      Breath sounds: Normal breath sounds.   Abdominal:      General: Bowel sounds are normal.      Palpations: Abdomen is soft.   Musculoskeletal:         General: Normal range of motion.      Cervical back: Normal range of motion and neck supple.      Right lower leg: No edema.      Left lower leg: No edema.   Skin:     General: Skin is warm and dry.      Capillary Refill: Capillary refill takes less than 2 seconds.   Neurological:      General: No focal deficit present.      Mental Status: She is alert.   Psychiatric:         Attention and Perception: Attention normal.         Mood and Affect: Mood is depressed. Affect is flat.         Speech: Speech normal.         Fluids  No intake or output data in the 24 hours ending 04/18/21 0526    Laboratory  Recent Results (from the past 48 hour(s))   EKG (NOW)    Collection Time: 04/18/21  1:49 AM   Result Value Ref Range    Report       [facility] Emergency Dept.    Test Date:  2021-04-18  Pt Name:    RAHEEM DALY              Department: ER  MRN:        [id number]                      Room:        04  Gender:     Female                       Technician:  [id number]  :        [id number]                   Requested By:KAITLYNN BURKETT  Order #:    [id number]                    Reading MD:    Measurements  Intervals                                Axis  Rate:       56                           P:  UT:                                      QRS:        27  QRSD:       84                           T:          -8  QT:         486  QTc:        470    Interpretive Statements  JUNCTIONAL ESCAPE RHYTHM  BORDERLINE T ABNORMALITIES, INFERIOR LEADS  ST ELEVATION SUGGESTS PERICARDITIS  No previous ECG available for comparison     Blood Alcohol    Collection Time: 21  2:05 AM   Result Value Ref Range    Diagnostic Alcohol 197.8 (H) 0.0 - 10.0 mg/dL   CBC WITH DIFFERENTIAL    Collection Time: 21  2:05 AM   Result Value Ref Range    WBC 2.2 (LL) 4.8 - 10.8 K/uL    RBC 3.38 (L) 4.20 - 5.40 M/uL    Hemoglobin 9.3 (L) 12.0 - 16.0 g/dL    Hematocrit 29.8 (L) 37.0 - 47.0 %    MCV 88.2 81.4 - 97.8 fL    MCH 27.5 27.0 - 33.0 pg    MCHC 31.2 (L) 33.6 - 35.0 g/dL    RDW 52.3 (H) 35.9 - 50.0 fL    Platelet Count 108 (L) 164 - 446 K/uL    MPV 10.8 9.0 - 12.9 fL    Neutrophils-Polys 58.60 44.00 - 72.00 %    Lymphocytes 27.60 22.00 - 41.00 %    Monocytes 10.10 0.00 - 13.40 %    Eosinophils 2.30 0.00 - 6.90 %    Basophils 0.90 0.00 - 1.80 %    Immature Granulocytes 0.50 0.00 - 0.90 %    Nucleated RBC 0.00 /100 WBC    Neutrophils (Absolute) 1.27 (L) 2.00 - 7.15 K/uL    Lymphs (Absolute) 0.60 (L) 1.00 - 4.80 K/uL    Monos (Absolute) 0.22 0.00 - 0.85 K/uL    Eos (Absolute) 0.05 0.00 - 0.51 K/uL    Baso (Absolute) 0.02 0.00 - 0.12 K/uL    Immature Granulocytes (abs) 0.01 0.00 - 0.11 K/uL    NRBC (Absolute) 0.00 K/uL   COMP METABOLIC PANEL    Collection Time: 04/18/21  2:05 AM   Result Value Ref Range    Sodium 142 135 - 145 mmol/L    Potassium 3.5 (L) 3.6 - 5.5 mmol/L    Chloride 106 96 - 112 mmol/L    Co2 25 20 - 33 mmol/L    Anion Gap 11.0 7.0 - 16.0    Glucose 129 (H) 65 - 99 mg/dL    Bun 10 8  - 22 mg/dL    Creatinine 0.55 0.50 - 1.40 mg/dL    Calcium 8.7 8.5 - 10.5 mg/dL    AST(SGOT) 55 (H) 12 - 45 U/L    ALT(SGPT) 20 2 - 50 U/L    Alkaline Phosphatase 94 30 - 99 U/L    Total Bilirubin 0.6 0.1 - 1.5 mg/dL    Albumin 3.8 3.2 - 4.9 g/dL    Total Protein 7.1 6.0 - 8.2 g/dL    Globulin 3.3 1.9 - 3.5 g/dL    A-G Ratio 1.2 g/dL   LIPASE    Collection Time: 04/18/21  2:05 AM   Result Value Ref Range    Lipase 67 11 - 82 U/L   Acetaminophen Level    Collection Time: 04/18/21  2:05 AM   Result Value Ref Range    Acetaminophen -Tylenol <5 (L) 10 - 30 ug/mL   SALICYLATE LEVEL    Collection Time: 04/18/21  2:05 AM   Result Value Ref Range    Salicylates, Quant. <1 (L) 15 - 25 mg/dL   ESTIMATED GFR    Collection Time: 04/18/21  2:05 AM   Result Value Ref Range    GFR If African American >60 >60 mL/min/1.73 m 2    GFR If Non African American >60 >60 mL/min/1.73 m 2   MAGNESIUM    Collection Time: 04/18/21  2:05 AM   Result Value Ref Range    Magnesium 1.7 1.5 - 2.5 mg/dL   SARS-CoV-2 PCR (24 hour In-House): Collect NP swab in VTM    Collection Time: 04/18/21  2:56 AM    Specimen: Nasopharyngeal; Respirate   Result Value Ref Range    SARS-CoV-2 Source NP Swab    URINE DRUG SCREEN (TRIAGE)    Collection Time: 04/18/21  3:35 AM   Result Value Ref Range    Amphetamines Urine Negative Negative    Barbiturates Negative Negative    Benzodiazepines Positive (A) Negative    Cocaine Metabolite Negative Negative    Methadone Negative Negative    Opiates Negative Negative    Oxycodone Negative Negative    Phencyclidine -Pcp Negative Negative    Propoxyphene Negative Negative    Cannabinoid Metab Negative Negative       Imaging  CT-HEAD W/O   Final Result         1.  No acute intracranial abnormality is identified, there are nonspecific white matter changes, commonly associated with small vessel ischemic disease.  Associated mild cerebral atrophy is noted.   2.  Hyperdensity in the posterior oropharynx, likely ingested material.  Both   3.  Atherosclerosis.      DX-CHEST-PORTABLE (1 VIEW)   Final Result         1.  Interstitial pulmonary parenchymal prominence suggest chronic underlying lung disease, component of interstitial edema and/or infiltrates not excluded.   2.  Cardiomegaly   3.  Atherosclerosis          Assessment/Plan  Alcohol dependence with intoxication (HCC)- (present on admission)  Assessment & Plan  Endorses EtOH use tonight and states that she uses EtOH daily  Monitor for signs of EtOH withdrawal  Vitamin supplementation    Suicide attempt by drug overdose (HCC)- (present on admission)  Assessment & Plan  Ingestion of clonidine and alcohol  Legal 2020 hold  1:1 sitter  Psychiatric consult in AM  Social work consult    Clonidine overdose- (present on admission)  Assessment & Plan  Suicide attempt via ingestion of clonidine 0.1 mg (10-15 tablets)  Supportive care  Monitor for hypotension and mental status changes  Continuous monitoring  Symptoms of clonidine overdose are usually seen within 1 to 8 hours of ingestion    DVT prophylaxis: Enoxaparin  PUD prophylaxis: Not indicated  Glycemic control: Sliding scale insulin  Nutrition: Cardiac diet  Lines: None  Alexander: Need for strict I/O monitoring, remove when able  Mobility: Early mobility as tolerated  Goals of care: Full code  Disposition: ICU    Discussed patient condition and risk of morbidity and/or mortality with Hospitalist, RN, RT, Pharmacy, Code status disscussed, Charge nurse / hot rounds, Patient and ERP.    The patient remains critically ill.  Critical care time = 55 minutes in directly providing and coordinating critical care and extensive data review.  No time overlap and excludes procedures.

## 2021-04-18 NOTE — ASSESSMENT & PLAN NOTE
Currently stable not on inhalers or oxygen.  Quit smoking 12 years ago.  Continue to monitor.  RT protocol if needed
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
## 2021-04-18 NOTE — HOSPITAL COURSE
"\"61 y.o. female who presented 4/18/2021 with after intentional overdose with clonidine (apparently took 10 to 15 tablets) in a suicide attempt as she states that she is an alcoholic and has been unable to stop drinking and she feels like she is a burden to her children.  Patient states that this is her first suicide attempt.  She denies any acute event that triggered this.  She endorses congestion with alcohol, but denies any additional medication ingestion.     In the ED the patient has remained hemodynamically stable.  I was consulted for ICU admission along his further evaluation and treatment.\"  "

## 2021-04-18 NOTE — ASSESSMENT & PLAN NOTE
Unspecified heart disease history per patient.  Mild cardiomegaly present on chest x-ray.  No signs of acute heart failure.  Continue to monitor.

## 2021-04-18 NOTE — PROGRESS NOTES
Patient sleeping in bed. VSS. 1:1 safety sitter at bedside. Room safety checklist completed with night shift RN.

## 2021-04-18 NOTE — ASSESSMENT & PLAN NOTE
Patient continues to remain frustrated with inability to refrain from alcohol.  Counseled patient on use of supportive groups such as Alcoholics Anonymous.

## 2021-04-18 NOTE — ED NOTES
Lab called with critical result of WBC 2.2 at 0250. Critical lab result read back to Lab.   Dr. Driver notified of critical lab result at 0252.  Critical lab result read back by Dr. Driver.

## 2021-04-18 NOTE — PROGRESS NOTES
"Critical Care Progress Note    Date of admission  4/18/2021    Chief Complaint  61 y.o. female admitted 4/18/2021 with intentional OD    Hospital Course  \"61 y.o. female who presented 4/18/2021 with after intentional overdose with clonidine (apparently took 10 to 15 tablets) in a suicide attempt as she states that she is an alcoholic and has been unable to stop drinking and she feels like she is a burden to her children.  Patient states that this is her first suicide attempt.  She denies any acute event that triggered this.  She endorses congestion with alcohol, but denies any additional medication ingestion.     In the ED the patient has remained hemodynamically stable.  I was consulted for ICU admission along his further evaluation and treatment.\"      Interval Problem Update  Reviewed last 24 hour events:   - Admitted this AM   - Neuro: AOx4, RASS -2   - HR: 60s-70s   - SBP: 80s-100s   - GI: FLD   - UOP: not recorded   - Alexander: no - purewick   - Tm: afeb   - Lines: PIV   - PPx: GI not indicated, DVT lovenox   - 2L NC   - CXR (personally reviewed and compared to prior): no new      Review of Systems  Review of Systems   Constitutional: Negative for chills and fever.   Eyes: Negative for blurred vision.   Respiratory: Negative for cough, sputum production, shortness of breath and stridor.    Cardiovascular: Negative for chest pain.   Gastrointestinal: Negative for abdominal pain, nausea and vomiting.   Genitourinary: Negative for dysuria.   Musculoskeletal: Negative for myalgias.   Skin: Negative for rash.   Neurological: Positive for tremors. Negative for dizziness, sensory change and focal weakness.   Psychiatric/Behavioral: Positive for substance abuse. The patient is nervous/anxious.         Vital Signs for last 24 hours   Temp:  [35.2 °C (95.4 °F)-36.2 °C (97.1 °F)] 36.1 °C (97 °F)  Pulse:  [55-71] 65  Resp:  [12-25] 15  BP: ()/(51-63) 95/55  SpO2:  [90 %-100 %] 99 %    Hemodynamic parameters for last 24 " hours       Respiratory Information for the last 24 hours       Physical Exam   Physical Exam  Vitals and nursing note reviewed.   Constitutional:       Appearance: She is ill-appearing.   HENT:      Head: Normocephalic and atraumatic.      Right Ear: External ear normal.      Left Ear: External ear normal.      Nose: Nose normal.      Mouth/Throat:      Mouth: Mucous membranes are moist.      Pharynx: Oropharynx is clear.   Eyes:      Extraocular Movements: Extraocular movements intact.      Conjunctiva/sclera: Conjunctivae normal.   Cardiovascular:      Rate and Rhythm: Normal rate and regular rhythm.      Pulses: Normal pulses.   Pulmonary:      Effort: Pulmonary effort is normal. No respiratory distress.   Abdominal:      General: Bowel sounds are normal. There is no distension.      Palpations: Abdomen is soft.      Tenderness: There is no abdominal tenderness.   Musculoskeletal:      Cervical back: Normal range of motion.   Skin:     General: Skin is warm and dry.      Capillary Refill: Capillary refill takes less than 2 seconds.   Neurological:      General: No focal deficit present.      Mental Status: She is alert.      GCS: GCS eye subscore is 3. GCS verbal subscore is 5. GCS motor subscore is 6.      Cranial Nerves: Cranial nerves are intact.      Sensory: Sensation is intact.      Motor: Tremor (trace fine tremor) present.      Comments: Sleepy but verbose when she awakens. Moving all 4 and grossly neuro intact   Psychiatric:         Cognition and Memory: Cognition is impaired.         Judgment: Judgment is inappropriate.         Medications  Current Facility-Administered Medications   Medication Dose Route Frequency Provider Last Rate Last Admin   • lactated ringers infusion   Intravenous Continuous Maurilio Matos M.D. 125 mL/hr at 04/18/21 0436 125 mL at 04/18/21 0436   • enoxaparin (LOVENOX) inj 40 mg  40 mg Subcutaneous DAILY Maurilio Matos M.D.   40 mg at 04/18/21 0502   • acetaminophen (Tylenol) tablet  650 mg  650 mg Oral Q6HRS PRN Maurilio Matos M.D.       • senna-docusate (PERICOLACE or SENOKOT S) 8.6-50 MG per tablet 2 tablet  2 tablet Oral BID Maurilio Matos M.D.   Stopped at 04/18/21 0600    And   • polyethylene glycol/lytes (MIRALAX) PACKET 1 Packet  1 Packet Oral QDAY PRN Maurilio Matos M.D.        And   • magnesium hydroxide (MILK OF MAGNESIA) suspension 30 mL  30 mL Oral QDAY PRN Maurilio Matos M.D.        And   • bisacodyl (DULCOLAX) suppository 10 mg  10 mg Rectal QDAY PRN Maurilio Matos M.D.       • potassium chloride SA (Kdur) tablet 40 mEq  40 mEq Oral Q6HRS Maurilio Matos M.D.   40 mEq at 04/18/21 0502   • [START ON 4/19/2021] thiamine (Vitamin B-1) tablet 100 mg  100 mg Oral DAILY Gary Cat M.D.        And   • [START ON 4/19/2021] multivitamin (THERAGRAN) tablet 1 tablet  1 tablet Oral DAILY Gary Cat M.D.        And   • [START ON 4/19/2021] folic acid (FOLVITE) tablet 1 mg  1 mg Oral DAILY Gary Cat M.D.           Fluids    Intake/Output Summary (Last 24 hours) at 4/18/2021 0851  Last data filed at 4/18/2021 0800  Gross per 24 hour   Intake 2066.67 ml   Output --   Net 2066.67 ml       Laboratory          Recent Labs     04/18/21  0205   SODIUM 142   POTASSIUM 3.5*   CHLORIDE 106   CO2 25   BUN 10   CREATININE 0.55   MAGNESIUM 1.7   CALCIUM 8.7     Recent Labs     04/18/21  0205   ALTSGPT 20   ASTSGOT 55*   ALKPHOSPHAT 94   TBILIRUBIN 0.6   LIPASE 67   GLUCOSE 129*     Recent Labs     04/18/21  0205   WBC 2.2*   NEUTSPOLYS 58.60   LYMPHOCYTES 27.60   MONOCYTES 10.10   EOSINOPHILS 2.30   BASOPHILS 0.90   ASTSGOT 55*   ALTSGPT 20   ALKPHOSPHAT 94   TBILIRUBIN 0.6     Recent Labs     04/18/21  0205   RBC 3.38*   HEMOGLOBIN 9.3*   HEMATOCRIT 29.8*   PLATELETCT 108*       Imaging  X-Ray:  I have personally reviewed the images and compared with prior images.    Assessment/Plan  * Hypotension due to drugs- (present on admission)  Assessment & Plan  Due to clonidine  Monitor, pressors PRN  for MAP <65 or SBP <90    Alcohol dependence with intoxication (HCC)- (present on admission)  Assessment & Plan  Uses EtOH daily  Showing signs of early EtOH withdrawal  Vitamin supplementation  PRN ativan, may need to progress to scheduled ativan/precedex    Suicide attempt by drug overdose (HCC)- (present on admission)  Assessment & Plan  Ingestion of clonidine and alcohol  Legal hold  1:1 sitter  Psychiatry consulted  Social work consult    Clonidine overdose- (present on admission)  Assessment & Plan  Suicide attempt via ingestion of clonidine 0.1 mg (10-15 tablets)  Supportive care  Monitor for hypotension and mental status changes  Continuous monitoring  Symptoms of clonidine overdose are usually seen within 1 to 8 hours of ingestion  May consider narcan infusion if she gets worse    Acquired pancytopenia ([facility])- (present on admission)  Assessment & Plan  Due to EtOH abuse  monitor    Hypertension- (present on admission)  Assessment & Plan  Hold antihypertensives in the setting of clonidine OD    COPD (chronic obstructive pulmonary disease) ([facility])- (present on admission)  Assessment & Plan  Not in acute exacerbation  RT/O2 Protocols  Titrate supplemental FiO2 to maintain SpO2 >92%  PRN Nebs    GERD (gastroesophageal reflux disease)- (present on admission)  Assessment & Plan  Monitor for need to initiate PPI       VTE:  Lovenox  Ulcer: Not Indicated  Lines: None    I have performed a physical exam and reviewed and updated ROS and Plan today (4/18/2021). In review of yesterday's note (4/17/2021), there are no changes except as documented above.     Discussed patient condition and risk of morbidity and/or mortality with RN, RT, Pharmacy, Dietary, Code status disscussed, Charge nurse / hot rounds and Patient     The patient remains critically ill.  Critical care time = 30 minutes in directly providing and coordinating critical care and extensive data review.  No time overlap and excludes procedures.

## 2021-04-19 LAB
ALBUMIN SERPL BCP-MCNC: 3.1 G/DL (ref 3.2–4.9)
ALBUMIN/GLOB SERPL: 1 G/DL
ALP SERPL-CCNC: 85 U/L (ref 30–99)
ALT SERPL-CCNC: 19 U/L (ref 2–50)
ANION GAP SERPL CALC-SCNC: 6 MMOL/L (ref 7–16)
AST SERPL-CCNC: 39 U/L (ref 12–45)
BASOPHILS # BLD AUTO: 0.5 % (ref 0–1.8)
BASOPHILS # BLD: 0.01 K/UL (ref 0–0.12)
BILIRUB SERPL-MCNC: 1.2 MG/DL (ref 0.1–1.5)
BUN SERPL-MCNC: 10 MG/DL (ref 8–22)
CALCIUM SERPL-MCNC: 8.6 MG/DL (ref 8.5–10.5)
CHLORIDE SERPL-SCNC: 103 MMOL/L (ref 96–112)
CO2 SERPL-SCNC: 27 MMOL/L (ref 20–33)
CREAT SERPL-MCNC: 0.51 MG/DL (ref 0.5–1.4)
EOSINOPHIL # BLD AUTO: 0.02 K/UL (ref 0–0.51)
EOSINOPHIL NFR BLD: 1.1 % (ref 0–6.9)
ERYTHROCYTE [DISTWIDTH] IN BLOOD BY AUTOMATED COUNT: 50.6 FL (ref 35.9–50)
GLOBULIN SER CALC-MCNC: 3 G/DL (ref 1.9–3.5)
GLUCOSE SERPL-MCNC: 115 MG/DL (ref 65–99)
HCT VFR BLD AUTO: 27.3 % (ref 37–47)
HGB BLD-MCNC: 8.5 G/DL (ref 12–16)
IMM GRANULOCYTES # BLD AUTO: 0 K/UL (ref 0–0.11)
IMM GRANULOCYTES NFR BLD AUTO: 0 % (ref 0–0.9)
LYMPHOCYTES # BLD AUTO: 0.48 K/UL (ref 1–4.8)
LYMPHOCYTES NFR BLD: 26.1 % (ref 22–41)
MAGNESIUM SERPL-MCNC: 1.6 MG/DL (ref 1.5–2.5)
MCH RBC QN AUTO: 27.1 PG (ref 27–33)
MCHC RBC AUTO-ENTMCNC: 31.1 G/DL (ref 33.6–35)
MCV RBC AUTO: 86.9 FL (ref 81.4–97.8)
MONOCYTES # BLD AUTO: 0.13 K/UL (ref 0–0.85)
MONOCYTES NFR BLD AUTO: 7.1 % (ref 0–13.4)
NEUTROPHILS # BLD AUTO: 1.2 K/UL (ref 2–7.15)
NEUTROPHILS NFR BLD: 65.2 % (ref 44–72)
NRBC # BLD AUTO: 0 K/UL
NRBC BLD-RTO: 0 /100 WBC
PHOSPHATE SERPL-MCNC: 3.8 MG/DL (ref 2.5–4.5)
PLATELET # BLD AUTO: 88 K/UL (ref 164–446)
PMV BLD AUTO: 10.4 FL (ref 9–12.9)
POTASSIUM SERPL-SCNC: 4.4 MMOL/L (ref 3.6–5.5)
PROT SERPL-MCNC: 6.1 G/DL (ref 6–8.2)
RBC # BLD AUTO: 3.14 M/UL (ref 4.2–5.4)
SODIUM SERPL-SCNC: 136 MMOL/L (ref 135–145)
WBC # BLD AUTO: 1.8 K/UL (ref 4.8–10.8)

## 2021-04-19 PROCEDURE — 85025 COMPLETE CBC W/AUTO DIFF WBC: CPT

## 2021-04-19 PROCEDURE — 700102 HCHG RX REV CODE 250 W/ 637 OVERRIDE(OP): Performed by: HOSPITALIST

## 2021-04-19 PROCEDURE — 83735 ASSAY OF MAGNESIUM: CPT

## 2021-04-19 PROCEDURE — 80053 COMPREHEN METABOLIC PANEL: CPT

## 2021-04-19 PROCEDURE — 99233 SBSQ HOSP IP/OBS HIGH 50: CPT | Performed by: HOSPITALIST

## 2021-04-19 PROCEDURE — 700111 HCHG RX REV CODE 636 W/ 250 OVERRIDE (IP): Performed by: STUDENT IN AN ORGANIZED HEALTH CARE EDUCATION/TRAINING PROGRAM

## 2021-04-19 PROCEDURE — 84100 ASSAY OF PHOSPHORUS: CPT

## 2021-04-19 PROCEDURE — 99232 SBSQ HOSP IP/OBS MODERATE 35: CPT | Performed by: PSYCHIATRY & NEUROLOGY

## 2021-04-19 PROCEDURE — A9270 NON-COVERED ITEM OR SERVICE: HCPCS | Performed by: HOSPITALIST

## 2021-04-19 PROCEDURE — 700105 HCHG RX REV CODE 258: Performed by: STUDENT IN AN ORGANIZED HEALTH CARE EDUCATION/TRAINING PROGRAM

## 2021-04-19 PROCEDURE — 700111 HCHG RX REV CODE 636 W/ 250 OVERRIDE (IP): Performed by: EMERGENCY MEDICINE

## 2021-04-19 PROCEDURE — 700111 HCHG RX REV CODE 636 W/ 250 OVERRIDE (IP): Performed by: HOSPITALIST

## 2021-04-19 PROCEDURE — 700102 HCHG RX REV CODE 250 W/ 637 OVERRIDE(OP): Performed by: STUDENT IN AN ORGANIZED HEALTH CARE EDUCATION/TRAINING PROGRAM

## 2021-04-19 PROCEDURE — A9270 NON-COVERED ITEM OR SERVICE: HCPCS | Performed by: EMERGENCY MEDICINE

## 2021-04-19 PROCEDURE — 700102 HCHG RX REV CODE 250 W/ 637 OVERRIDE(OP): Performed by: EMERGENCY MEDICINE

## 2021-04-19 PROCEDURE — 700111 HCHG RX REV CODE 636 W/ 250 OVERRIDE (IP): Performed by: INTERNAL MEDICINE

## 2021-04-19 PROCEDURE — A9270 NON-COVERED ITEM OR SERVICE: HCPCS | Performed by: STUDENT IN AN ORGANIZED HEALTH CARE EDUCATION/TRAINING PROGRAM

## 2021-04-19 PROCEDURE — 99233 SBSQ HOSP IP/OBS HIGH 50: CPT | Performed by: INTERNAL MEDICINE

## 2021-04-19 PROCEDURE — 770020 HCHG ROOM/CARE - TELE (206)

## 2021-04-19 RX ORDER — LORAZEPAM 2 MG/ML
0.5 INJECTION INTRAMUSCULAR EVERY 4 HOURS PRN
Status: DISCONTINUED | OUTPATIENT
Start: 2021-04-19 | End: 2021-04-22 | Stop reason: HOSPADM

## 2021-04-19 RX ORDER — TRAMADOL HYDROCHLORIDE 50 MG/1
25 TABLET ORAL EVERY 6 HOURS PRN
Status: DISCONTINUED | OUTPATIENT
Start: 2021-04-19 | End: 2021-04-22 | Stop reason: HOSPADM

## 2021-04-19 RX ORDER — LORAZEPAM 2 MG/1
2 TABLET ORAL
Status: DISCONTINUED | OUTPATIENT
Start: 2021-04-19 | End: 2021-04-22 | Stop reason: HOSPADM

## 2021-04-19 RX ORDER — ONDANSETRON 4 MG/1
4 TABLET, ORALLY DISINTEGRATING ORAL EVERY 4 HOURS PRN
Status: DISCONTINUED | OUTPATIENT
Start: 2021-04-19 | End: 2021-04-22 | Stop reason: HOSPADM

## 2021-04-19 RX ORDER — LORAZEPAM 2 MG/1
4 TABLET ORAL
Status: DISCONTINUED | OUTPATIENT
Start: 2021-04-19 | End: 2021-04-22 | Stop reason: HOSPADM

## 2021-04-19 RX ORDER — LORAZEPAM 1 MG/1
1 TABLET ORAL EVERY 4 HOURS PRN
Status: DISCONTINUED | OUTPATIENT
Start: 2021-04-19 | End: 2021-04-22 | Stop reason: HOSPADM

## 2021-04-19 RX ORDER — LORAZEPAM 2 MG/ML
2 INJECTION INTRAMUSCULAR
Status: DISCONTINUED | OUTPATIENT
Start: 2021-04-19 | End: 2021-04-22 | Stop reason: HOSPADM

## 2021-04-19 RX ORDER — DIVALPROEX SODIUM 250 MG/1
250 TABLET, DELAYED RELEASE ORAL EVERY 8 HOURS
Status: DISCONTINUED | OUTPATIENT
Start: 2021-04-19 | End: 2021-04-22 | Stop reason: HOSPADM

## 2021-04-19 RX ORDER — GABAPENTIN 100 MG/1
100 CAPSULE ORAL 3 TIMES DAILY
Status: DISCONTINUED | OUTPATIENT
Start: 2021-04-19 | End: 2021-04-21

## 2021-04-19 RX ORDER — CHLORDIAZEPOXIDE HYDROCHLORIDE 5 MG/1
5 CAPSULE, GELATIN COATED ORAL EVERY 8 HOURS
Status: DISCONTINUED | OUTPATIENT
Start: 2021-04-19 | End: 2021-04-20

## 2021-04-19 RX ORDER — LORAZEPAM 2 MG/ML
1 INJECTION INTRAMUSCULAR
Status: DISCONTINUED | OUTPATIENT
Start: 2021-04-19 | End: 2021-04-22 | Stop reason: HOSPADM

## 2021-04-19 RX ORDER — LORAZEPAM 0.5 MG/1
0.5 TABLET ORAL EVERY 4 HOURS PRN
Status: DISCONTINUED | OUTPATIENT
Start: 2021-04-19 | End: 2021-04-22 | Stop reason: HOSPADM

## 2021-04-19 RX ORDER — LORAZEPAM 2 MG/ML
1.5 INJECTION INTRAMUSCULAR
Status: DISCONTINUED | OUTPATIENT
Start: 2021-04-19 | End: 2021-04-22 | Stop reason: HOSPADM

## 2021-04-19 RX ADMIN — LORAZEPAM 0.5 MG: 2 INJECTION INTRAMUSCULAR; INTRAVENOUS at 02:13

## 2021-04-19 RX ADMIN — ACETAMINOPHEN 650 MG: 325 TABLET, FILM COATED ORAL at 11:08

## 2021-04-19 RX ADMIN — LORAZEPAM 1.5 MG: 2 INJECTION INTRAMUSCULAR; INTRAVENOUS at 20:00

## 2021-04-19 RX ADMIN — ENOXAPARIN SODIUM 40 MG: 40 INJECTION SUBCUTANEOUS at 05:31

## 2021-04-19 RX ADMIN — SODIUM CHLORIDE, POTASSIUM CHLORIDE, SODIUM LACTATE AND CALCIUM CHLORIDE: 600; 310; 30; 20 INJECTION, SOLUTION INTRAVENOUS at 12:27

## 2021-04-19 RX ADMIN — Medication 100 MG: at 05:31

## 2021-04-19 RX ADMIN — LORAZEPAM 1.5 MG: 2 INJECTION INTRAMUSCULAR; INTRAVENOUS at 16:29

## 2021-04-19 RX ADMIN — SODIUM CHLORIDE, POTASSIUM CHLORIDE, SODIUM LACTATE AND CALCIUM CHLORIDE: 600; 310; 30; 20 INJECTION, SOLUTION INTRAVENOUS at 04:06

## 2021-04-19 RX ADMIN — MAGNESIUM GLUCONATE 500 MG ORAL TABLET 400 MG: 500 TABLET ORAL at 17:42

## 2021-04-19 RX ADMIN — LORAZEPAM 1.5 MG: 2 INJECTION INTRAMUSCULAR; INTRAVENOUS at 21:16

## 2021-04-19 RX ADMIN — DIVALPROEX SODIUM 250 MG: 250 TABLET, DELAYED RELEASE ORAL at 22:56

## 2021-04-19 RX ADMIN — GABAPENTIN 100 MG: 100 CAPSULE ORAL at 14:10

## 2021-04-19 RX ADMIN — CHLORDIAZEPOXIDE HYDROCHLORIDE 5 MG: 5 CAPSULE ORAL at 14:10

## 2021-04-19 RX ADMIN — LORAZEPAM 0.5 MG: 2 INJECTION INTRAMUSCULAR; INTRAVENOUS at 05:32

## 2021-04-19 RX ADMIN — DIVALPROEX SODIUM 250 MG: 250 TABLET, DELAYED RELEASE ORAL at 16:34

## 2021-04-19 RX ADMIN — CHLORDIAZEPOXIDE HYDROCHLORIDE 5 MG: 5 CAPSULE ORAL at 22:56

## 2021-04-19 RX ADMIN — ONDANSETRON 4 MG: 4 TABLET, ORALLY DISINTEGRATING ORAL at 04:43

## 2021-04-19 RX ADMIN — FOLIC ACID 1 MG: 1 TABLET ORAL at 05:31

## 2021-04-19 RX ADMIN — LORAZEPAM 1.5 MG: 2 INJECTION INTRAMUSCULAR; INTRAVENOUS at 23:12

## 2021-04-19 RX ADMIN — THERA TABS 1 TABLET: TAB at 05:31

## 2021-04-19 RX ADMIN — LORAZEPAM 0.5 MG: 2 INJECTION INTRAMUSCULAR; INTRAVENOUS at 14:10

## 2021-04-19 RX ADMIN — LORAZEPAM 0.5 MG: 2 INJECTION INTRAMUSCULAR; INTRAVENOUS at 11:09

## 2021-04-19 RX ADMIN — GABAPENTIN 100 MG: 100 CAPSULE ORAL at 17:42

## 2021-04-19 RX ADMIN — ONDANSETRON 4 MG: 4 TABLET, ORALLY DISINTEGRATING ORAL at 11:08

## 2021-04-19 RX ADMIN — DOCUSATE SODIUM 50 MG AND SENNOSIDES 8.6 MG 2 TABLET: 8.6; 5 TABLET, FILM COATED ORAL at 05:31

## 2021-04-19 ASSESSMENT — ENCOUNTER SYMPTOMS
HEADACHES: 0
CARDIOVASCULAR NEGATIVE: 1
VOMITING: 0
FOCAL WEAKNESS: 0
CHILLS: 0
NERVOUS/ANXIOUS: 1
BACK PAIN: 1
DEPRESSION: 1
WEAKNESS: 0
STRIDOR: 0
PALPITATIONS: 0
BRUISES/BLEEDS EASILY: 0
TREMORS: 1
ABDOMINAL PAIN: 0
RESPIRATORY NEGATIVE: 1
NECK PAIN: 0
GASTROINTESTINAL NEGATIVE: 1
NEUROLOGICAL NEGATIVE: 1
FEVER: 0
NAUSEA: 0
EYES NEGATIVE: 1
CONSTITUTIONAL NEGATIVE: 1
COUGH: 0
SENSORY CHANGE: 0
POLYDIPSIA: 0
HEARTBURN: 0
SHORTNESS OF BREATH: 0
DIZZINESS: 0
BLURRED VISION: 0

## 2021-04-19 ASSESSMENT — LIFESTYLE VARIABLES
TREMOR: MODERATE TREMOR WITH ARMS EXTENDED
HEADACHE, FULLNESS IN HEAD: SEVERE
TREMOR: TREMOR NOT VISIBLE BUT CAN BE FELT, FINGERTIP TO FINGERTIP
VISUAL DISTURBANCES: NOT PRESENT
TOTAL SCORE: 9
PAROXYSMAL SWEATS: NO SWEAT VISIBLE
AUDITORY DISTURBANCES: NOT PRESENT
ANXIETY: MILDLY ANXIOUS
ORIENTATION AND CLOUDING OF SENSORIUM: DATE DISORIENTATION BY MORE THAN TWO CALENDAR DAYS
ORIENTATION AND CLOUDING OF SENSORIUM: ORIENTED AND CAN DO SERIAL ADDITIONS
AGITATION: NORMAL ACTIVITY
AGITATION: SOMEWHAT MORE THAN NORMAL ACTIVITY
TREMOR: *
TREMOR: TREMOR NOT VISIBLE BUT CAN BE FELT, FINGERTIP TO FINGERTIP
HEADACHE, FULLNESS IN HEAD: VERY MILD
ORIENTATION AND CLOUDING OF SENSORIUM: ORIENTED AND CAN DO SERIAL ADDITIONS
PAROXYSMAL SWEATS: NO SWEAT VISIBLE
PAROXYSMAL SWEATS: NO SWEAT VISIBLE
VISUAL DISTURBANCES: MILD SENSITIVITY
VISUAL DISTURBANCES: VERY MILD SENSITIVITY
SUBSTANCE_ABUSE: 1
PAROXYSMAL SWEATS: NO SWEAT VISIBLE
ANXIETY: *
NAUSEA AND VOMITING: NO NAUSEA AND NO VOMITING
TOTAL SCORE: 10
VISUAL DISTURBANCES: NOT PRESENT
PAROXYSMAL SWEATS: NO SWEAT VISIBLE
ANXIETY: MODERATELY ANXIOUS OR GUARDED, SO ANXIETY IS INFERRED
AUDITORY DISTURBANCES: VERY MILD HARSHNESS OR ABILITY TO FRIGHTEN
AGITATION: MODERATELY FIDGETY AND RESTLESS
NAUSEA AND VOMITING: NO NAUSEA AND NO VOMITING
AUDITORY DISTURBANCES: NOT PRESENT
NAUSEA AND VOMITING: MILD NAUSEA WITH NO VOMITING
PAROXYSMAL SWEATS: NO SWEAT VISIBLE
NAUSEA AND VOMITING: *
AGITATION: SOMEWHAT MORE THAN NORMAL ACTIVITY
TREMOR: TREMOR NOT VISIBLE BUT CAN BE FELT, FINGERTIP TO FINGERTIP
ORIENTATION AND CLOUDING OF SENSORIUM: ORIENTED AND CAN DO SERIAL ADDITIONS
ANXIETY: MILDLY ANXIOUS
NAUSEA AND VOMITING: MILD NAUSEA WITH NO VOMITING
HEADACHE, FULLNESS IN HEAD: MODERATELY SEVERE
TOTAL SCORE: 15
NAUSEA AND VOMITING: MILD NAUSEA WITH NO VOMITING
AUDITORY DISTURBANCES: NOT PRESENT
VISUAL DISTURBANCES: NOT PRESENT
AGITATION: MODERATELY FIDGETY AND RESTLESS
TREMOR: TREMOR NOT VISIBLE BUT CAN BE FELT, FINGERTIP TO FINGERTIP
ORIENTATION AND CLOUDING OF SENSORIUM: DATE DISORIENTATION BY MORE THAN TWO CALENDAR DAYS
HEADACHE, FULLNESS IN HEAD: SEVERE
AGITATION: SOMEWHAT MORE THAN NORMAL ACTIVITY
TOTAL SCORE: 21
AUDITORY DISTURBANCES: NOT PRESENT
AUDITORY DISTURBANCES: NOT PRESENT
AGITATION: NORMAL ACTIVITY
ORIENTATION AND CLOUDING OF SENSORIUM: DATE DISORIENTATION BY MORE THAN TWO CALENDAR DAYS
VISUAL DISTURBANCES: VERY MILD SENSITIVITY
ANXIETY: *
AUDITORY DISTURBANCES: NOT PRESENT
TOTAL SCORE: 15
TOTAL SCORE: 18
HEADACHE, FULLNESS IN HEAD: MILD
TOTAL SCORE: 5
HEADACHE, FULLNESS IN HEAD: VERY MILD
ANXIETY: *
TOTAL SCORE: VERY MILD ITCHING, PINS AND NEEDLES SENSATION, BURNING OR NUMBNESS
TREMOR: *
NAUSEA AND VOMITING: NO NAUSEA AND NO VOMITING
HEADACHE, FULLNESS IN HEAD: VERY SEVERE
VISUAL DISTURBANCES: NOT PRESENT
PAROXYSMAL SWEATS: NO SWEAT VISIBLE
ANXIETY: *
ORIENTATION AND CLOUDING OF SENSORIUM: DATE DISORIENTATION BY MORE THAN TWO CALENDAR DAYS

## 2021-04-19 ASSESSMENT — PAIN DESCRIPTION - PAIN TYPE
TYPE: ACUTE PAIN;CHRONIC PAIN
TYPE: ACUTE PAIN
TYPE: CHRONIC PAIN
TYPE: ACUTE PAIN
TYPE: ACUTE PAIN;CHRONIC PAIN

## 2021-04-19 NOTE — PROGRESS NOTES
Leanna from Lab called with critical result of WBC at 1.8 Critical lab result read back to Leanna.   Dr. Cat notified of critical lab result at 0500.  Critical lab result read back by Dr. Cat.  No new orders received at the moment.

## 2021-04-19 NOTE — PROGRESS NOTES
"Hospital Medicine Daily Progress Note    Date of Service  4/19/2021    Chief Complaint  61 y.o. female admitted 4/18/2021 with reports of overdose, initially reported in form of clonidine and alcohol, the patient later reports taking trazodone in excess    Hospital Course  61 y.o. female who presented 4/18/2021 with intentional suicide attempt through clonidine overdose.  This is a pleasant woman with a history of alcohol dependence with cirrhosis, COPD not on home oxygen, GERD, heart disease unspecified, hypertension, and scoliosis.  She reports having gone through detox from heroin and methamphetamine 2 months ago but was frustrated with her continued use of alcohol.  She reports having gone through 35 treatment centers between [place] and other [facility], in which she has lived.  She texted her son that she was going to \"end it all\" and took approximately 10 tablets of clonidine while she was drinking.  She reports she did \"something stupid\" but was a intentional attempt at suicide.  She currently reports regretting her atttempt.  She reports that she obtained the clonidine from a recent detoxification and that she was discharged with it.  She also reports history of persistent depression with anxiety.  The patient was placed on a legal hold and transferred to ICU letter for overdose and close monitoring.    Interval Problem Update  Patient seen and examined today. ICU Care  Care and plan discussed in IDT/Hot rounds.  Lines and assistive devices reviewed.    Patient tolerating treatment and therapies.  All Data, Medication data reviewed.  Case discussed with nursing as available.  Plan of Care reviewed with patient and notified of changes.  4/19 the patient appears improved, no hypertension encountered, afebrile, heart rate in the 70s, the patient is on 1 L nasal cannula oxygen, blood pressure 110/58, the patient is anxious, on a Rass Ativan schedule, she states that she has been drinking recently and reports that " she overdosed on trazodone not clonidine, unclear, psychiatry is following, a legal hold is in place.  Patient still has pancytopenia with ongoing slight decrease in cell counts, no bleeding noted, chemistry is overall improved now, her drug screen is positive for benzodiazepines, the patient reports that she has been through many rehab and detox centers, she does have back pain.  Consultants/Specialty  Psychiatry  Intensivist  Code Status  Full Code    Disposition  TBD, possible dispo to inpatient psych    Review of Systems  Review of Systems   Constitutional: Negative.  Negative for chills and fever.   HENT: Negative.    Eyes: Negative.    Respiratory: Negative.  Negative for cough.    Cardiovascular: Negative.  Negative for chest pain and palpitations.   Gastrointestinal: Negative.  Negative for heartburn, nausea and vomiting.   Genitourinary: Negative.  Negative for dysuria and frequency.   Musculoskeletal: Positive for back pain and joint pain. Negative for neck pain.   Skin: Negative.  Negative for itching and rash.   Neurological: Negative.  Negative for dizziness, focal weakness, weakness and headaches.   Endo/Heme/Allergies: Negative.  Negative for polydipsia. Does not bruise/bleed easily.   Psychiatric/Behavioral: Positive for depression and substance abuse. The patient is nervous/anxious.         Physical Exam  Temp:  [36.4 °C (97.6 °F)-36.8 °C (98.2 °F)] 36.5 °C (97.7 °F)  Pulse:  [58-71] 71  Resp:  [15-21] 18  BP: ()/(55-77) 110/58  SpO2:  [95 %-99 %] 95 %    Physical Exam  Vitals and nursing note reviewed.   Constitutional:       Appearance: She is well-developed. She is not diaphoretic.      Comments: Elderly female, looks older than stated age   HENT:      Head: Normocephalic and atraumatic.      Nose: Nose normal.   Eyes:      Conjunctiva/sclera: Conjunctivae normal.      Pupils: Pupils are equal, round, and reactive to light.   Neck:      Thyroid: No thyromegaly.      Vascular: No JVD.    Cardiovascular:      Rate and Rhythm: Normal rate and regular rhythm.      Heart sounds: Normal heart sounds. No friction rub. No gallop.    Pulmonary:      Effort: Pulmonary effort is normal.      Breath sounds: Normal breath sounds. No wheezing or rales.   Abdominal:      General: Bowel sounds are normal. There is no distension.      Palpations: Abdomen is soft. There is no mass.      Tenderness: There is no abdominal tenderness. There is no guarding or rebound.   Musculoskeletal:         General: No tenderness. Normal range of motion.      Cervical back: Normal range of motion and neck supple.   Lymphadenopathy:      Cervical: No cervical adenopathy.   Skin:     General: Skin is warm and dry.      Coloration: Skin is pale.   Neurological:      Mental Status: She is oriented to person, place, and time. She is lethargic.      Cranial Nerves: No cranial nerve deficit.   Psychiatric:         Behavior: Behavior normal.         Fluids    Intake/Output Summary (Last 24 hours) at 4/19/2021 1452  Last data filed at 4/19/2021 1400  Gross per 24 hour   Intake 4473.75 ml   Output 3045 ml   Net 1428.75 ml       Laboratory  Recent Labs     04/18/21  0205 04/19/21  0415   WBC 2.2* 1.8*   RBC 3.38* 3.14*   HEMOGLOBIN 9.3* 8.5*   HEMATOCRIT 29.8* 27.3*   MCV 88.2 86.9   MCH 27.5 27.1   MCHC 31.2* 31.1*   RDW 52.3* 50.6*   PLATELETCT 108* 88*   MPV 10.8 10.4     Recent Labs     04/18/21  0205 04/19/21  0415   SODIUM 142 136   POTASSIUM 3.5* 4.4   CHLORIDE 106 103   CO2 25 27   GLUCOSE 129* 115*   BUN 10 10   CREATININE 0.55 0.51   CALCIUM 8.7 8.6                   Imaging  CT-HEAD W/O   Final Result         1.  No acute intracranial abnormality is identified, there are nonspecific white matter changes, commonly associated with small vessel ischemic disease.  Associated mild cerebral atrophy is noted.   2.  Hyperdensity in the posterior oropharynx, likely ingested material. Both   3.  Atherosclerosis.      DX-CHEST-PORTABLE (1  VIEW)   Final Result         1.  Interstitial pulmonary parenchymal prominence suggest chronic underlying lung disease, component of interstitial edema and/or infiltrates not excluded.   2.  Cardiomegaly   3.  Atherosclerosis           Assessment/Plan  * Hypotension due to drugs- (present on admission)  Assessment & Plan  Secondary to clonidine overdose.    Resolved, monitor    Alcohol dependence with intoxication (HCC)- (present on admission)  Assessment & Plan  Patient continues to remain frustrated with inability to refrain from alcohol.    Counseled patient on use of supportive groups such as Alcoholics Anonymous.    Suicide attempt by drug overdose (HCC)- (present on admission)  Assessment & Plan  Patient confirmed it was a suicide attempt.    I personally placed the patient on legal hold.    Clonidine overdose- (present on admission)  Assessment & Plan  Ms. Aceves was here with a confirmed overdose of clonidine; she has no other known overdoses.    The patient later states that she might of taken trazodone as well, unclear, monitor    Acquired pancytopenia (HCC)- (present on admission)  Assessment & Plan  Likely secondary to bone marrow suppression in setting of chronic alcoholism.  Question of liver disease in conjunction, check hepatitis panel  No current signs of infection.  Continue to monitor closely.    Hypokalemia- (present on admission)  Assessment & Plan  Replace for potassium goal greater than 4.0.  Magnesium goal greater than 2.0.    Hypertension- (present on admission)  Assessment & Plan  History of hypertension.  Currently hypotensive due to clonidine overdose.    Cirrhosis with alcoholism (HCC)- (present on admission)  Assessment & Plan  As per history due to continued alcoholism.    Patient counseled on alcohol cessation as per above.    Heart disease- (present on admission)  Assessment & Plan  Unspecified heart disease history per patient.  Mild cardiomegaly present on chest x-ray.  No signs  of acute heart failure.    Continue to monitor.  Consider echocardiogram.    COPD (chronic obstructive pulmonary disease) (HCC)- (present on admission)  Assessment & Plan  Currently stable not on inhalers or oxygen.  Quit smoking 12 years ago.    Continue to monitor.    GERD (gastroesophageal reflux disease)- (present on admission)  Assessment & Plan  As per history he has history of GERD.  No current symptoms.    Continue to monitor.    Scoliosis- (present on admission)  Assessment & Plan  As per history has history of scoliosis that affects her ambulation.    Continue to monitor.  Consider PT and OT consults.     Plan  Adjust medication regimen for detox and over dose  Monitor closely  Verified legal hold  Psychiatry following  Evaluate over the next day to see her further disposition  See orders  Acute complex high risk patient    VTE prophylaxis: Lovenox    I have performed a physical exam and reviewed and updated ROS and Plan today . In review of yesterday's note , there are no changes except as documented above.        Please note that this dictation was created using voice recognition software. I have made every reasonable attempt to correct obvious errors, but I expect that there are errors of grammar and possibly context that I did not discover before finalizing the note.

## 2021-04-19 NOTE — PROGRESS NOTES
Pt uses [facility] [phone] or [phone], Called to get RX HX. Carlin MEDEL

## 2021-04-19 NOTE — PROGRESS NOTES
"Critical Care Progress Note    Date of admission  4/18/2021    Chief Complaint  61 y.o. female admitted 4/18/2021 with intentional OD    Hospital Course  \"61 y.o. female who presented 4/18/2021 with after intentional overdose with clonidine (apparently took 10 to 15 tablets) in a suicide attempt as she states that she is an alcoholic and has been unable to stop drinking and she feels like she is a burden to her children.  Patient states that this is her first suicide attempt.  She denies any acute event that triggered this.  She endorses congestion with alcohol, but denies any additional medication ingestion.     In the ED the patient has remained hemodynamically stable.  I was consulted for ICU admission along his further evaluation and treatment.\"      Interval Problem Update  Reviewed last 24 hour events:  Remains critically ill  On 2L NC, sat >90%  WBC 1.8 with ANC 1200  Platelet 88K  Hgb 8.5 from 9.3  Creatinine 0.51, HCO3 27  Good UOP, + 3L since admission  Electrolytes normal  Pt is alert, and appropriate, and able to answer my questions  + trremor    Review of Systems  Review of Systems   Constitutional: Negative for chills and fever.   Eyes: Negative for blurred vision.   Respiratory: Negative for cough, shortness of breath and stridor.    Cardiovascular: Negative for chest pain.   Gastrointestinal: Negative for abdominal pain, nausea and vomiting.   Genitourinary: Negative for dysuria.   Skin: Negative for rash.   Neurological: Positive for tremors. Negative for dizziness, sensory change and focal weakness.   Psychiatric/Behavioral: Positive for substance abuse. The patient is nervous/anxious.         Vital Signs for last 24 hours   Temp:  [36.1 °C (97 °F)-36.8 °C (98.2 °F)] 36.8 °C (98.2 °F)  Pulse:  [58-71] 61  Resp:  [14-25] 19  BP: ()/(55-77) 109/57  SpO2:  [95 %-100 %] 98 %    Hemodynamic parameters for last 24 hours       Respiratory Information for the last 24 hours       Physical Exam "   Physical Exam  Vitals and nursing note reviewed.   Constitutional:       Appearance: She is ill-appearing.   HENT:      Head: Normocephalic and atraumatic.      Right Ear: External ear normal.      Left Ear: External ear normal.      Nose: Nose normal.      Mouth/Throat:      Mouth: Mucous membranes are moist.      Pharynx: Oropharynx is clear.   Eyes:      Extraocular Movements: Extraocular movements intact.      Conjunctiva/sclera: Conjunctivae normal.   Cardiovascular:      Rate and Rhythm: Normal rate and regular rhythm.      Pulses: Normal pulses.   Pulmonary:      Effort: Pulmonary effort is normal. No respiratory distress.   Abdominal:      General: Bowel sounds are normal. There is no distension.      Palpations: Abdomen is soft.      Tenderness: There is no abdominal tenderness.   Musculoskeletal:      Cervical back: Normal range of motion.   Skin:     General: Skin is warm and dry.      Capillary Refill: Capillary refill takes less than 2 seconds.   Neurological:      General: No focal deficit present.      Mental Status: She is alert.      GCS: GCS eye subscore is 3. GCS verbal subscore is 5. GCS motor subscore is 6.      Cranial Nerves: Cranial nerves are intact.      Sensory: Sensation is intact.      Motor: Tremor (trace fine tremor) present.      Comments: Sleepy but verbose when she awakens. Moving all 4 and grossly neuro intact   Psychiatric:         Cognition and Memory: Cognition is impaired.         Judgment: Judgment is inappropriate.         Medications  Current Facility-Administered Medications   Medication Dose Route Frequency Provider Last Rate Last Admin   • ondansetron (ZOFRAN ODT) dispertab 4 mg  4 mg Oral Q4HRS PRN Gary Cat M.D.   4 mg at 04/19/21 0443   • lactated ringers infusion   Intravenous Continuous Maurilio Matos M.D. 125 mL/hr at 04/19/21 0406 New Bag at 04/19/21 0406   • enoxaparin (LOVENOX) inj 40 mg  40 mg Subcutaneous DAILY Maurilio Matos M.D.   40 mg at 04/19/21  0531   • acetaminophen (Tylenol) tablet 650 mg  650 mg Oral Q6HRS PRN Maurilio Matos M.D.   650 mg at 04/18/21 1610   • senna-docusate (PERICOLACE or SENOKOT S) 8.6-50 MG per tablet 2 tablet  2 tablet Oral BID Maurilio Matos M.D.   2 tablet at 04/19/21 0531    And   • polyethylene glycol/lytes (MIRALAX) PACKET 1 Packet  1 Packet Oral QDAY PRN Maurilio Matos M.D.        And   • magnesium hydroxide (MILK OF MAGNESIA) suspension 30 mL  30 mL Oral QDAY PRN Maurilio Matos M.D.        And   • bisacodyl (DULCOLAX) suppository 10 mg  10 mg Rectal QDAY PRN Maurilio Matos M.D.       • thiamine (Vitamin B-1) tablet 100 mg  100 mg Oral DAILY Gary Cat M.D.   100 mg at 04/19/21 0531    And   • multivitamin (THERAGRAN) tablet 1 tablet  1 tablet Oral DAILY Gary Cat M.D.   1 tablet at 04/19/21 0531    And   • folic acid (FOLVITE) tablet 1 mg  1 mg Oral DAILY Gary Cat M.D.   1 mg at 04/19/21 0531   • LORazepam (ATIVAN) injection 0.5 mg  0.5 mg Intravenous Q4HRS Mickey Bernstein Jr., D.O.   0.5 mg at 04/19/21 0532   • LORazepam (ATIVAN) injection 1-2 mg  1-2 mg Intravenous Q2HRS PRN Mickey Bernstein Jr., D.O.   1 mg at 04/18/21 2026   • hydrALAZINE (APRESOLINE) injection 20 mg  20 mg Intravenous Q6HRS PRN Mickey Bernstein Jr., D.O.       • labetalol (NORMODYNE/TRANDATE) injection 10 mg  10 mg Intravenous Q6HRS PRN Mickey Bernstein Jr., D.O.   10 mg at 04/18/21 1743       Fluids    Intake/Output Summary (Last 24 hours) at 4/19/2021 0643  Last data filed at 4/19/2021 0600  Gross per 24 hour   Intake 3700 ml   Output 1995 ml   Net 1705 ml       Laboratory          Recent Labs     04/18/21  0205 04/19/21 0415   SODIUM 142 136   POTASSIUM 3.5* 4.4   CHLORIDE 106 103   CO2 25 27   BUN 10 10   CREATININE 0.55 0.51   MAGNESIUM 1.7 1.6   PHOSPHORUS  --  3.8   CALCIUM 8.7 8.6     Recent Labs     04/18/21  0205 04/19/21 0415   ALTSGPT 20 19   ASTSGOT 55* 39   ALKPHOSPHAT 94 85   TBILIRUBIN 0.6 1.2   LIPASE 67  --    GLUCOSE  129* 115*     Recent Labs     04/18/21  0205 04/19/21  0415   WBC 2.2* 1.8*   NEUTSPOLYS 58.60 65.20   LYMPHOCYTES 27.60 26.10   MONOCYTES 10.10 7.10   EOSINOPHILS 2.30 1.10   BASOPHILS 0.90 0.50   ASTSGOT 55* 39   ALTSGPT 20 19   ALKPHOSPHAT 94 85   TBILIRUBIN 0.6 1.2     Recent Labs     04/18/21  0205 04/19/21  0415   RBC 3.38* 3.14*   HEMOGLOBIN 9.3* 8.5*   HEMATOCRIT 29.8* 27.3*   PLATELETCT 108* 88*       Imaging  X-Ray:  I have personally reviewed the images and compared with prior images.    Assessment/Plan  * Hypotension due to drugs- (present on admission)  Assessment & Plan  Due to clonidine  Monitor, pressors PRN for MAP <65 or SBP <90    Alcohol dependence with intoxication (HCC)- (present on admission)  Assessment & Plan  Uses EtOH daily, last dose was prior to admission  Pt showing some mild EtOH withdrawal symptoms  On ativan 0.5 IV Q4H scheduled and 1-2mg IV prn  Fluids  Vitamin supplementation      Suicide attempt by drug overdose (HCC)- (present on admission)  Assessment & Plan  Ingestion of clonidine and alcohol  Legal hold  1:1 sitter  Psychiatry consulted  Social work consult    Clonidine overdose- (present on admission)  Assessment & Plan  Suicide attempt via ingestion of clonidine 0.1 mg (10-15 tablets)  BP has been stable and normal thus far.   Monitor for hypotension   May consider narcan infusion if she gets worse  Supportive care      Acquired pancytopenia (HCC)- (present on admission)  Assessment & Plan  Due to EtOH abuse  Monitor  Transfuse to keep Hgb >7, plt >20K    Hypertension- (present on admission)  Assessment & Plan  Hold antihypertensives in the setting of clonidine OD    COPD (chronic obstructive pulmonary disease) (HCC)- (present on admission)  Assessment & Plan  Not in acute exacerbation  RT/O2 Protocols  Titrate supplemental FiO2 to maintain SpO2 >92%  PRN Nebs    GERD (gastroesophageal reflux disease)- (present on admission)  Assessment & Plan  Monitor for need to initiate  PPI       VTE:  Lovenox  Ulcer: Not Indicated  Lines: None    I have performed a physical exam and reviewed and updated ROS and Plan today (4/19/2021). In review of yesterday's note (4/18/2021), there are no changes except as documented above.     Discussed patient condition and risk of morbidity and/or mortality with RN, RT, Pharmacy, Dietary, Code status disscussed, Charge nurse / hot rounds and Patient     Can transfer out of ICU to telemetry bed  D/w Dr. Wheeler, [facility]  Will sign off, please call with questions. .

## 2021-04-19 NOTE — CONSULTS
PSYCHIATRIC FOLLOW-UP:(established)  *Reason for admission: intentional overdose with clonidine (apparently took 10 to 15 tablets) in a suicide attempt as she states that she is an alcoholic and has been unable to stop drinking and she feels like she is a burden to her children.                 *Legal Hold Status: on legal hold                *HPI: irritable, states she is hurting all over and doesn't feel good but denies SI though she is depressed.            Medical ROS (as pertinent):                        *Psychiatric Examination:   Vitals:   Vitals:    04/19/21 0800 04/19/21 1000 04/19/21 1200 04/19/21 1400   BP: 116/63 131/75 (!) 97/65 110/58   Pulse: 61 64 66 71   Resp: 17 15 20 18   Temp: 36.4 °C (97.6 °F)   36.5 °C (97.7 °F)   TempSrc: Temporal   Temporal   SpO2: 99% 99% 96% 95%   Weight:       Height:         General Appearance:  poor eye contact  Abnormal Movements: none   Gait and Posture: on her side curled up  Speech: soft  Thought Process: minimal  Associations:   linear  Abnormal or Psychotic Thoughts: none  Judgement and Insight: impaired   Orientation: grossly intact  Recent and Remote Memory: grossly intact  Attention Span and Concentration: intact  Language:fluent  Fund of Knowledge: not tested  Mood and Affect: irritible and discomfort  SI/HI:  suicidal - no and homicidal - no      *ASSESSMENT/RECOMENDATIONS:     1. Alcohol use disorder  - daily with 1 years sobriety in her hx per pt          2. Heroin use diosrder   - IV, clean x 2 weeks per pt     3. Depressive disorder unspc  -reportedly on effexor with unclear response: unclear compliance (she has told me she takes it regularly then that she doesn't) and drinking  -have not yet started meds    4. Medical:  -scoliosis: chronic pain  -pancytopenia: likely alcohol induced     Legal hold: extended  Observation status: 1:1 sitter  Privileges (while on legal hold): family      *Will Follow

## 2021-04-19 NOTE — RESPIRATORY CARE
"  COPD EDUCATION by COPD CLINICAL EDUCATOR  4/18/2021  at  13:16 PM by Kenna Martinez, RRT     Patient interviewed by COPD education team.  Patient unable to participate in full program.  Short intervention has been conducted.  A comprehensive packet including information about COPD, treatments, and smoking cessation given.    Smoking Cessation Intervention and education completed, 8 minutes spent on smoking cessation education with patient.  Provided smoking cessation packet with \"Tips to Quit\" and brochure for \"Free Smoking Cessation Classes\".   Pt given spacer for home use, left materials outside of room with RN.       COPD Assessment  COPD Clinical Specialists ONLY  COPD Education Initiated: Yes--Short Intervention  Physician Follow Up Appointment: (Pt on SI hold)  Pulmonary Follow Up Appointment: (Pt on SI hold)  Referrals Initiated: (Pt on SI hold, referrals declined and not appropriate until discharge location is determined)  Smoking Cessation: Yes  $ Smoking Cessation 3-10 Minutes: Symptomatic  Is this a COPD exacerbation patient?: No  $ Demo/Eval of SVN's, MDI's and Aerosols: Yes  $ Bedside PFT Screen: (equipment not available)    Meds to Beds        MY COPD ACTION PLAN     It is recommended that patients and physicians /healthcare providers complete this action plan together. This plan should be discussed at each physician visit and updated as needed.    The green, yellow and red zones show groups of symptoms of COPD. This list of symptoms is not comprehensive, and you may experience other symptoms. In the \"Actions\" column, your healthcare provider has recommended actions for you to take based on your symptoms.    Patient Name: Kacie Aceves   YOB: 1960   Last Updated on:     Green Zone:  I am doing well today Actions   •  Usual activitiy and exercise level •  Take daily medications   •  Usual amounts of cough and phlegm/mucus •  Use oxygen as prescribed   •  Sleep well at night •  Continue " "regular exercise/diet plan   •  Appetite is good •  At all times avoid cigarette smoke, inhaled irritants     Daily Medications (these medications are taken every day):   Fluticosone/Salmeterol (Advair) 1 Puff Twice daily     Additional Information:  Rinse and spit after taking advair    Yellow Zone:  I am having a bad day or a COPD flare Actions   •  More breathless than usual •  Continue daily medications   •  I have less energy for my daily activities •  Use quick relief inhaler as ordered   •  Increased or thicker phlegm/mucus •  Use oxygen as prescribed   •  Using quick relief inhaler/nebulizer more often •  Get plenty of rest   •  Swelling of ankles more than usual •  Use pursed lip breathing   •  More coughing than usual •  At all times avoid cigarette smoke, inhaled irritants   •  I feel like I have a \"chest cold\"   •  Poor sleep and my symptoms woke me up   •  My appetite is not good   •  My medicine is not helping    •  Call provider immediately if symptoms don’t improve     Continue daily medications, add rescue medications:   Albuterol 2 Puffs Every 4 hours PRN       Medications to be used during a flare up, (as Discussed with Provider):           Additional Information:  Use spacer with albuterol    Red Zone:  I need urgent medical care Actions   •  Severe shortness of breath even at rest •  Call 911 or seek medical care immediately   •  Not able to do any activity because of breathing    •  Fever or shaking chills    •  Feeling confused or very drowsy     •  Chest pains    •  Coughing up blood              "

## 2021-04-20 LAB
ANION GAP SERPL CALC-SCNC: 7 MMOL/L (ref 7–16)
BUN SERPL-MCNC: 9 MG/DL (ref 8–22)
CALCIUM SERPL-MCNC: 8.5 MG/DL (ref 8.5–10.5)
CHLORIDE SERPL-SCNC: 102 MMOL/L (ref 96–112)
CO2 SERPL-SCNC: 28 MMOL/L (ref 20–33)
CREAT SERPL-MCNC: 0.66 MG/DL (ref 0.5–1.4)
ERYTHROCYTE [DISTWIDTH] IN BLOOD BY AUTOMATED COUNT: 52.3 FL (ref 35.9–50)
FERRITIN SERPL-MCNC: 65.2 NG/ML (ref 10–291)
GLUCOSE SERPL-MCNC: 97 MG/DL (ref 65–99)
HCT VFR BLD AUTO: 27.6 % (ref 37–47)
HGB BLD-MCNC: 8.5 G/DL (ref 12–16)
IRON SATN MFR SERPL: 32 % (ref 15–55)
IRON SERPL-MCNC: 104 UG/DL (ref 40–170)
MCH RBC QN AUTO: 27.2 PG (ref 27–33)
MCHC RBC AUTO-ENTMCNC: 30.8 G/DL (ref 33.6–35)
MCV RBC AUTO: 88.2 FL (ref 81.4–97.8)
PHOSPHATE SERPL-MCNC: 4.4 MG/DL (ref 2.5–4.5)
PLATELET # BLD AUTO: 75 K/UL (ref 164–446)
PMV BLD AUTO: 10.4 FL (ref 9–12.9)
POTASSIUM SERPL-SCNC: 4.3 MMOL/L (ref 3.6–5.5)
RBC # BLD AUTO: 3.13 M/UL (ref 4.2–5.4)
SODIUM SERPL-SCNC: 137 MMOL/L (ref 135–145)
TIBC SERPL-MCNC: 327 UG/DL (ref 250–450)
UIBC SERPL-MCNC: 223 UG/DL (ref 110–370)
WBC # BLD AUTO: 2.6 K/UL (ref 4.8–10.8)

## 2021-04-20 PROCEDURE — 83550 IRON BINDING TEST: CPT

## 2021-04-20 PROCEDURE — 700102 HCHG RX REV CODE 250 W/ 637 OVERRIDE(OP): Performed by: STUDENT IN AN ORGANIZED HEALTH CARE EDUCATION/TRAINING PROGRAM

## 2021-04-20 PROCEDURE — 700105 HCHG RX REV CODE 258: Performed by: HOSPITALIST

## 2021-04-20 PROCEDURE — 80048 BASIC METABOLIC PNL TOTAL CA: CPT

## 2021-04-20 PROCEDURE — 84100 ASSAY OF PHOSPHORUS: CPT

## 2021-04-20 PROCEDURE — 700102 HCHG RX REV CODE 250 W/ 637 OVERRIDE(OP): Performed by: EMERGENCY MEDICINE

## 2021-04-20 PROCEDURE — A9270 NON-COVERED ITEM OR SERVICE: HCPCS | Performed by: EMERGENCY MEDICINE

## 2021-04-20 PROCEDURE — 85027 COMPLETE CBC AUTOMATED: CPT

## 2021-04-20 PROCEDURE — 99232 SBSQ HOSP IP/OBS MODERATE 35: CPT | Performed by: HOSPITALIST

## 2021-04-20 PROCEDURE — 700101 HCHG RX REV CODE 250: Performed by: HOSPITALIST

## 2021-04-20 PROCEDURE — A9270 NON-COVERED ITEM OR SERVICE: HCPCS | Performed by: STUDENT IN AN ORGANIZED HEALTH CARE EDUCATION/TRAINING PROGRAM

## 2021-04-20 PROCEDURE — 770020 HCHG ROOM/CARE - TELE (206)

## 2021-04-20 PROCEDURE — 700102 HCHG RX REV CODE 250 W/ 637 OVERRIDE(OP): Performed by: HOSPITALIST

## 2021-04-20 PROCEDURE — 83540 ASSAY OF IRON: CPT

## 2021-04-20 PROCEDURE — 700111 HCHG RX REV CODE 636 W/ 250 OVERRIDE (IP): Performed by: STUDENT IN AN ORGANIZED HEALTH CARE EDUCATION/TRAINING PROGRAM

## 2021-04-20 PROCEDURE — A9270 NON-COVERED ITEM OR SERVICE: HCPCS | Performed by: HOSPITALIST

## 2021-04-20 PROCEDURE — 82728 ASSAY OF FERRITIN: CPT

## 2021-04-20 RX ORDER — LIDOCAINE 50 MG/G
1 PATCH TOPICAL EVERY 24 HOURS
Status: DISCONTINUED | OUTPATIENT
Start: 2021-04-20 | End: 2021-04-22 | Stop reason: HOSPADM

## 2021-04-20 RX ORDER — CHLORDIAZEPOXIDE HYDROCHLORIDE 5 MG/1
5 CAPSULE, GELATIN COATED ORAL 2 TIMES DAILY
Status: DISCONTINUED | OUTPATIENT
Start: 2021-04-21 | End: 2021-04-21

## 2021-04-20 RX ADMIN — ENOXAPARIN SODIUM 40 MG: 40 INJECTION SUBCUTANEOUS at 06:12

## 2021-04-20 RX ADMIN — MAGNESIUM GLUCONATE 500 MG ORAL TABLET 400 MG: 500 TABLET ORAL at 06:09

## 2021-04-20 RX ADMIN — DIVALPROEX SODIUM 250 MG: 250 TABLET, DELAYED RELEASE ORAL at 22:38

## 2021-04-20 RX ADMIN — CHLORDIAZEPOXIDE HYDROCHLORIDE 5 MG: 5 CAPSULE ORAL at 06:12

## 2021-04-20 RX ADMIN — LORAZEPAM 1 MG: 1 TABLET ORAL at 14:52

## 2021-04-20 RX ADMIN — MAGNESIUM GLUCONATE 500 MG ORAL TABLET 400 MG: 500 TABLET ORAL at 17:05

## 2021-04-20 RX ADMIN — THERA TABS 1 TABLET: TAB at 06:08

## 2021-04-20 RX ADMIN — DIVALPROEX SODIUM 250 MG: 250 TABLET, DELAYED RELEASE ORAL at 06:11

## 2021-04-20 RX ADMIN — LORAZEPAM 2 MG: 2 TABLET ORAL at 23:35

## 2021-04-20 RX ADMIN — GABAPENTIN 100 MG: 100 CAPSULE ORAL at 11:46

## 2021-04-20 RX ADMIN — GABAPENTIN 100 MG: 100 CAPSULE ORAL at 06:11

## 2021-04-20 RX ADMIN — LORAZEPAM 2 MG: 2 TABLET ORAL at 11:47

## 2021-04-20 RX ADMIN — CHLORDIAZEPOXIDE HYDROCHLORIDE 5 MG: 5 CAPSULE ORAL at 14:52

## 2021-04-20 RX ADMIN — Medication 100 MG: at 06:08

## 2021-04-20 RX ADMIN — DOCUSATE SODIUM 50 MG AND SENNOSIDES 8.6 MG 2 TABLET: 8.6; 5 TABLET, FILM COATED ORAL at 06:10

## 2021-04-20 RX ADMIN — LORAZEPAM 1 MG: 1 TABLET ORAL at 17:05

## 2021-04-20 RX ADMIN — ACETAMINOPHEN 650 MG: 325 TABLET, FILM COATED ORAL at 19:48

## 2021-04-20 RX ADMIN — LIDOCAINE 1 PATCH: 50 PATCH TOPICAL at 19:48

## 2021-04-20 RX ADMIN — LORAZEPAM 2 MG: 2 TABLET ORAL at 19:48

## 2021-04-20 RX ADMIN — LORAZEPAM 2 MG: 2 TABLET ORAL at 06:10

## 2021-04-20 RX ADMIN — LORAZEPAM 2 MG: 2 TABLET ORAL at 03:02

## 2021-04-20 RX ADMIN — SODIUM CHLORIDE, POTASSIUM CHLORIDE, SODIUM LACTATE AND CALCIUM CHLORIDE: 600; 310; 30; 20 INJECTION, SOLUTION INTRAVENOUS at 03:04

## 2021-04-20 RX ADMIN — GABAPENTIN 100 MG: 100 CAPSULE ORAL at 17:05

## 2021-04-20 RX ADMIN — FOLIC ACID 1 MG: 1 TABLET ORAL at 06:09

## 2021-04-20 RX ADMIN — ACETAMINOPHEN 650 MG: 325 TABLET, FILM COATED ORAL at 03:02

## 2021-04-20 RX ADMIN — DIVALPROEX SODIUM 250 MG: 250 TABLET, DELAYED RELEASE ORAL at 14:52

## 2021-04-20 ASSESSMENT — LIFESTYLE VARIABLES
NAUSEA AND VOMITING: MILD NAUSEA WITH NO VOMITING
HEADACHE, FULLNESS IN HEAD: VERY SEVERE
AGITATION: NORMAL ACTIVITY
VISUAL DISTURBANCES: NOT PRESENT
AGITATION: NORMAL ACTIVITY
TOTAL SCORE: 9
TREMOR: TREMOR NOT VISIBLE BUT CAN BE FELT, FINGERTIP TO FINGERTIP
HEADACHE, FULLNESS IN HEAD: MODERATE
VISUAL DISTURBANCES: NOT PRESENT
TREMOR: TREMOR NOT VISIBLE BUT CAN BE FELT, FINGERTIP TO FINGERTIP
HEADACHE, FULLNESS IN HEAD: SEVERE
PAROXYSMAL SWEATS: NO SWEAT VISIBLE
ANXIETY: MODERATELY ANXIOUS OR GUARDED, SO ANXIETY IS INFERRED
VISUAL DISTURBANCES: NOT PRESENT
TREMOR: TREMOR NOT VISIBLE BUT CAN BE FELT, FINGERTIP TO FINGERTIP
ORIENTATION AND CLOUDING OF SENSORIUM: DATE DISORIENTATION BY MORE THAN TWO CALENDAR DAYS
AUDITORY DISTURBANCES: NOT PRESENT
NAUSEA AND VOMITING: NO NAUSEA AND NO VOMITING
ORIENTATION AND CLOUDING OF SENSORIUM: DATE DISORIENTATION BY MORE THAN TWO CALENDAR DAYS
AGITATION: SOMEWHAT MORE THAN NORMAL ACTIVITY
TOTAL SCORE: 11
HEADACHE, FULLNESS IN HEAD: MILD
ORIENTATION AND CLOUDING OF SENSORIUM: DATE DISORIENTATION BY NO MORE THAN TWO CALENDAR DAYS
PAROXYSMAL SWEATS: NO SWEAT VISIBLE
PAROXYSMAL SWEATS: NO SWEAT VISIBLE
VISUAL DISTURBANCES: NOT PRESENT
TREMOR: TREMOR NOT VISIBLE BUT CAN BE FELT, FINGERTIP TO FINGERTIP
ANXIETY: MODERATELY ANXIOUS OR GUARDED, SO ANXIETY IS INFERRED
ORIENTATION AND CLOUDING OF SENSORIUM: DATE DISORIENTATION BY MORE THAN TWO CALENDAR DAYS
TREMOR: *
ORIENTATION AND CLOUDING OF SENSORIUM: CANNOT DO SERIAL ADDITIONS OR IS UNCERTAIN ABOUT DATE
NAUSEA AND VOMITING: NO NAUSEA AND NO VOMITING
HEADACHE, FULLNESS IN HEAD: MODERATE
ANXIETY: MODERATELY ANXIOUS OR GUARDED, SO ANXIETY IS INFERRED
AUDITORY DISTURBANCES: NOT PRESENT
PAROXYSMAL SWEATS: BARELY PERCEPTIBLE SWEATING. PALMS MOIST
AGITATION: NORMAL ACTIVITY
PAROXYSMAL SWEATS: NO SWEAT VISIBLE
ANXIETY: MODERATELY ANXIOUS OR GUARDED, SO ANXIETY IS INFERRED
AUDITORY DISTURBANCES: NOT PRESENT
VISUAL DISTURBANCES: NOT PRESENT
AGITATION: SOMEWHAT MORE THAN NORMAL ACTIVITY
TOTAL SCORE: VERY MILD ITCHING, PINS AND NEEDLES SENSATION, BURNING OR NUMBNESS
HEADACHE, FULLNESS IN HEAD: MODERATE
TOTAL SCORE: 13
AUDITORY DISTURBANCES: NOT PRESENT
AUDITORY DISTURBANCES: NOT PRESENT
AGITATION: NORMAL ACTIVITY
ORIENTATION AND CLOUDING OF SENSORIUM: DATE DISORIENTATION BY MORE THAN TWO CALENDAR DAYS
ANXIETY: MILDLY ANXIOUS
AUDITORY DISTURBANCES: NOT PRESENT
NAUSEA AND VOMITING: NO NAUSEA AND NO VOMITING
TOTAL SCORE: 11
VISUAL DISTURBANCES: NOT PRESENT
ANXIETY: NO ANXIETY (AT EASE)
TREMOR: TREMOR NOT VISIBLE BUT CAN BE FELT, FINGERTIP TO FINGERTIP
PAROXYSMAL SWEATS: NO SWEAT VISIBLE
TOTAL SCORE: 11
PAROXYSMAL SWEATS: NO SWEAT VISIBLE
ANXIETY: MILDLY ANXIOUS
TOTAL SCORE: 8
NAUSEA AND VOMITING: MILD NAUSEA WITH NO VOMITING
NAUSEA AND VOMITING: NO NAUSEA AND NO VOMITING
VISUAL DISTURBANCES: NOT PRESENT
TREMOR: *
NAUSEA AND VOMITING: MILD NAUSEA WITH NO VOMITING
AGITATION: *
ORIENTATION AND CLOUDING OF SENSORIUM: DATE DISORIENTATION BY NO MORE THAN TWO CALENDAR DAYS
HEADACHE, FULLNESS IN HEAD: MILD
AUDITORY DISTURBANCES: NOT PRESENT
TOTAL SCORE: 14

## 2021-04-20 ASSESSMENT — ENCOUNTER SYMPTOMS
HEADACHES: 0
DEPRESSION: 1
FEVER: 0
ABDOMINAL PAIN: 0
SORE THROAT: 0
BACK PAIN: 1
PALPITATIONS: 0
NAUSEA: 0
SHORTNESS OF BREATH: 0

## 2021-04-20 ASSESSMENT — PAIN DESCRIPTION - PAIN TYPE
TYPE: CHRONIC PAIN;ACUTE PAIN
TYPE: ACUTE PAIN
TYPE: ACUTE PAIN

## 2021-04-20 ASSESSMENT — FIBROSIS 4 INDEX
FIB4 SCORE: 7.4
FIB4 SCORE: 7.4

## 2021-04-20 ASSESSMENT — COGNITIVE AND FUNCTIONAL STATUS - GENERAL
DAILY ACTIVITIY SCORE: 18
MOVING FROM LYING ON BACK TO SITTING ON SIDE OF FLAT BED: A LITTLE
PERSONAL GROOMING: A LITTLE
TOILETING: A LITTLE
TURNING FROM BACK TO SIDE WHILE IN FLAT BAD: A LITTLE
SUGGESTED CMS G CODE MODIFIER DAILY ACTIVITY: CK
CLIMB 3 TO 5 STEPS WITH RAILING: A LITTLE
WALKING IN HOSPITAL ROOM: A LITTLE
MOVING TO AND FROM BED TO CHAIR: A LITTLE
MOBILITY SCORE: 18
EATING MEALS: A LITTLE
SUGGESTED CMS G CODE MODIFIER MOBILITY: CK
STANDING UP FROM CHAIR USING ARMS: A LITTLE
DRESSING REGULAR UPPER BODY CLOTHING: A LITTLE
HELP NEEDED FOR BATHING: A LITTLE
DRESSING REGULAR LOWER BODY CLOTHING: A LITTLE

## 2021-04-20 NOTE — PROGRESS NOTES
Hospital Medicine Daily Progress Note    Date of Service  4/20/2021    Chief Complaint  Suicide attempt with clonidine and EtOH per family.    Hospital Course  61 y.o. female with a history of cirrhosis, EtOH dependence, COPD, GERD, CAD, HTN, chronic back pain, depression and prior detox from heroin and methamphetamine 2 months ago.  She admitted 4/18/2021 with an attempted suicide with taking ~10 tabs of clonidine while drinking alcohol. She was in the ICU for levophed drip.    Interval Problem Update  4/20: Plt:75, WBC:2.6, Hgb:8.5.  Sleepy and hard to arouse but became appropriate but lethargic.  On legal hold with sitter in room.      Consultants/Specialty  Intensivist  Psychiatry    Code Status  Full Code    Disposition  LEGAL HOLD    Review of Systems  Review of Systems   Constitutional: Negative for fever.   HENT: Negative for sore throat.    Respiratory: Negative for shortness of breath.    Cardiovascular: Negative for palpitations and leg swelling.   Gastrointestinal: Negative for abdominal pain and nausea.   Musculoskeletal: Positive for back pain.   Neurological: Negative for headaches.   Psychiatric/Behavioral: Positive for depression.        Physical Exam  Temp:  [36.4 °C (97.6 °F)-36.9 °C (98.5 °F)] 36.9 °C (98.5 °F)  Pulse:  [65-82] 82  Resp:  [10-28] 18  BP: ()/(40-84) 149/84  SpO2:  [82 %-98 %] 95 %    Physical Exam  Vitals reviewed.   Constitutional:       Appearance: Normal appearance. She is not diaphoretic.   HENT:      Head: Normocephalic and atraumatic.      Nose: Nose normal.      Mouth/Throat:      Pharynx: No oropharyngeal exudate.   Eyes:      General: No scleral icterus.        Right eye: No discharge.         Left eye: No discharge.      Extraocular Movements: Extraocular movements intact.      Conjunctiva/sclera: Conjunctivae normal.   Cardiovascular:      Rate and Rhythm: Normal rate and regular rhythm.      Pulses:           Radial pulses are 2+ on the right side and 2+ on the  left side.        Dorsalis pedis pulses are 2+ on the right side and 2+ on the left side.      Heart sounds: No murmur.   Pulmonary:      Effort: Pulmonary effort is normal. No respiratory distress.      Breath sounds: Normal breath sounds. No wheezing or rales.   Abdominal:      General: Bowel sounds are normal. There is no distension.      Palpations: Abdomen is soft.   Musculoskeletal:         General: No swelling or tenderness.      Cervical back: No tenderness. No muscular tenderness.      Right lower leg: No edema.      Left lower leg: No edema.   Lymphadenopathy:      Cervical: No cervical adenopathy.   Skin:     Coloration: Skin is not jaundiced or pale.   Neurological:      General: No focal deficit present.      Mental Status: She is alert and oriented to person, place, and time. Mental status is at baseline.      Cranial Nerves: No cranial nerve deficit.   Psychiatric:         Mood and Affect: Mood is depressed.         Speech: Speech is delayed.         Behavior: Behavior is slowed.         Thought Content: Thought content does not include suicidal ideation.         Fluids    Intake/Output Summary (Last 24 hours) at 4/20/2021 1831  Last data filed at 4/20/2021 1200  Gross per 24 hour   Intake 1575 ml   Output 1875 ml   Net -300 ml       Laboratory  Recent Labs     04/18/21  0205 04/19/21  0415 04/20/21  0315   WBC 2.2* 1.8* 2.6*   RBC 3.38* 3.14* 3.13*   HEMOGLOBIN 9.3* 8.5* 8.5*   HEMATOCRIT 29.8* 27.3* 27.6*   MCV 88.2 86.9 88.2   MCH 27.5 27.1 27.2   MCHC 31.2* 31.1* 30.8*   RDW 52.3* 50.6* 52.3*   PLATELETCT 108* 88* 75*   MPV 10.8 10.4 10.4     Recent Labs     04/18/21  0205 04/19/21  0415 04/20/21  0315   SODIUM 142 136 137   POTASSIUM 3.5* 4.4 4.3   CHLORIDE 106 103 102   CO2 25 27 28   GLUCOSE 129* 115* 97   BUN 10 10 9   CREATININE 0.55 0.51 0.66   CALCIUM 8.7 8.6 8.5                   Imaging  CT-HEAD W/O   Final Result         1.  No acute intracranial abnormality is identified, there are  nonspecific white matter changes, commonly associated with small vessel ischemic disease.  Associated mild cerebral atrophy is noted.   2.  Hyperdensity in the posterior oropharynx, likely ingested material. Both   3.  Atherosclerosis.      DX-CHEST-PORTABLE (1 VIEW)   Final Result         1.  Interstitial pulmonary parenchymal prominence suggest chronic underlying lung disease, component of interstitial edema and/or infiltrates not excluded.   2.  Cardiomegaly   3.  Atherosclerosis           Assessment/Plan  * Hypotension due to drugs- (present on admission)  Assessment & Plan  Secondary to clonidine overdose.    Resolved, monitoring vitals.    Alcohol dependence with intoxication (HCC)- (present on admission)  Assessment & Plan  Patient continues to remain frustrated with inability to refrain from alcohol.  Counseled patient on use of supportive groups such as Alcoholics Anonymous.    Suicide attempt by drug overdose (HCC)- (present on admission)  Assessment & Plan  Suicide attempt  On legal Hold    Clonidine overdose- (present on admission)  Assessment & Plan  Ms. Aceves was here with a confirmed overdose of clonidine; she has no other known overdoses.    The patient later states that she might of taken trazodone as well, unclear, monitor  Psychiatry consulting  Legal hold.  Watch for rebound hypertension    Acquired pancytopenia (HCC)- (present on admission)  Assessment & Plan  Likely secondary to bone marrow suppression in setting of chronic alcoholism.  Question of liver disease in conjunction, check hepatitis panel  No current signs of infection.  Continue to monitor closely.    Hypokalemia- (present on admission)  Assessment & Plan  Replace for potassium goal greater than 4.0.  Magnesium goal greater than 2.0.  4/20 K:4.3    Hypertension- (present on admission)  Assessment & Plan  History of hypertension.    S/p hypotension from clonidine overdose  Has prn labetalol and hydralazine  If becomes more  consistant initiate other oral BP med  monitor vitals.    Cirrhosis with alcoholism (HCC)- (present on admission)  Assessment & Plan  Continue with cessation of alcohol encouragement    Heart disease- (present on admission)  Assessment & Plan  Unspecified heart disease history per patient.  Mild cardiomegaly present on chest x-ray.  No signs of acute heart failure.  Continue to monitor.    COPD (chronic obstructive pulmonary disease) ([facility])- (present on admission)  Assessment & Plan  Currently stable not on inhalers or oxygen.  Quit smoking 12 years ago.  Continue to monitor.  RT protocol if needed    GERD (gastroesophageal reflux disease)- (present on admission)  Assessment & Plan  As per history he has history of GERD.  No current symptoms.  Continue to monitor.    Scoliosis- (present on admission)  Assessment & Plan  As per history has history of scoliosis that affects her ambulation.  lidoderm patch added 4/20  Continue to monitor.  Consider PT and OT consults.       VTE prophylaxis: SCDs

## 2021-04-20 NOTE — DISCHARGE PLANNING
Care Transition Team Discharge Planning    Anticipated Discharge Disposition: TBD    Action: Per hospitalist rounds, pt has a sitter, is responsive but slow, and has flat affect. MD is waiting for psych evaluation. Pt has an order for transfer from ICU level of care.     Barriers to Discharge: medical clearance/stability    Plan: Lsw will continue to follow, attend rounds for medical updates, and assist w/ d/c planning.     UPDATE:  LSw received contact from psych team. An evaluation was completed and entered on 4.19.21

## 2021-04-20 NOTE — DISCHARGE PLANNING
Filed petition to the court via [employer]. Waiting on verified petition.    Received verified petition from the court. Scanned copy of legal hold extension into pt's chart.

## 2021-04-20 NOTE — CARE PLAN
Problem: Infection  Goal: Will remain free from infection  Outcome: PROGRESSING AS EXPECTED  Intervention: Assess signs and symptoms of infection  Note: See Flowsheets for VS.  Intervention: Implement standard precautions and perform hand washing before and after patient contact  Note: Standard precautions in place.  Intervention: Assess for removal of potential routes of infection, such as IV, central line, intra-arterial or urinary catheters  Note: Alexander in place.  To be addressed during morning rounding.     Problem: Communication  Goal: The ability to communicate needs accurately and effectively will improve  Outcome: PROGRESSING SLOWER THAN EXPECTED  Intervention: [place] patient and significant other/support system to call light to alert staff of needs  Flowsheets (Taken 4/19/2021 2000)  Oriented to:: All of the Following : Location of Bathroom, Visiting Policy, Unit Routine, Call Light and Bedside Controls, Bedside Rail Policy, Smoking Policy, Rights and Responsibilities, Bedside Report, and Patient Education Notebook  Note: Pt has limited attention span and evidences poor memory at this time.  Reinforcement needed.  Intervention: Reorient patient to environment as needed  Flowsheets (Taken 4/20/2021 0000)  Oriented to:: All of the Following : Location of Bathroom, Visiting Policy, Unit Routine, Call Light and Bedside Controls, Bedside Rail Policy, Smoking Policy, Rights and Responsibilities, Bedside Report, and Patient Education Notebook  Intervention: Educate patient and significant other/support system about the plan of care, procedures, treatments, medications and allow for questions  Flowsheets (Taken 4/19/2021 2000)  Pt & Family Have Been Educated on Methods Available to Report Concerns Related to Care, Treatment, Services, and Patient Safety Issues: Unable at this Time (Comments)  Note: Pt is not interested in anything that does not immediately address her desire for ativan and/or to sleep.

## 2021-04-20 NOTE — PROGRESS NOTES
"Late Entry  Room Safety Checklist for Behavioral Health Patient completed  - Pt searched and all belongings removed from the pt: verified  - Activate suicide risk nursing protocol: completed prior to start of shift  - Pt's clothes, shoes/laces, and jewelry removed and pt is dressed in a hospital gown.  If pt is wearing a bra, it is also removed.  Valuables sent to safekeeping.   Pt belongings have been labeled, itemized and stored appropriately based on location of pt: verified  - \"STOP - Please see RN\" sign in place outside pt room: verified  - Unnecessary IV poles are moved from the room: verified  - Unnecessary medical equipment and cords removed from the room (walkers, commodes, pulse oximetry, call light): verified  - Telephone and telephone cords removed from the room.  Any calls are supervised by either the sitter or the RN: verified  - Beds are made with only flat sheets or blankets -- no fitted sheets: verified  - Disposable utensils are counted before meal tray enters the room: completed as appropriate  - Disposable utensils are counted after meal tray is removed the room: completed as appropriate  - Any extra items are removed from closets: verified  - Glove boxes are removed from cages and placed outside the room: verified  - No extra bed linen is stored in the room: verified  - Any easily movable furniture is removed form the room: verified  - Ensure no items are left behind by ancillary services (EVS, PT, Lab): completed as appropriate  - If pt has been to another area (e.g. Imaging, surgery), pt is searched upon return to unit to ensure they did not acquire any unauthorized items along the way: completed as appropriate  - Privacy curtain or bottom of privacy curtain is removed: verified  - Only one trash can liner in place in each trash can (not multiple layers): verified    "

## 2021-04-21 LAB
ALBUMIN SERPL BCP-MCNC: 3 G/DL (ref 3.2–4.9)
ALBUMIN/GLOB SERPL: 1 G/DL
ALP SERPL-CCNC: 84 U/L (ref 30–99)
ALT SERPL-CCNC: 16 U/L (ref 2–50)
ANION GAP SERPL CALC-SCNC: 4 MMOL/L (ref 7–16)
AST SERPL-CCNC: 34 U/L (ref 12–45)
BASOPHILS # BLD AUTO: 0 % (ref 0–1.8)
BASOPHILS # BLD: 0 K/UL (ref 0–0.12)
BILIRUB SERPL-MCNC: 0.7 MG/DL (ref 0.1–1.5)
BUN SERPL-MCNC: 7 MG/DL (ref 8–22)
CALCIUM SERPL-MCNC: 8.5 MG/DL (ref 8.5–10.5)
CHLORIDE SERPL-SCNC: 101 MMOL/L (ref 96–112)
CO2 SERPL-SCNC: 28 MMOL/L (ref 20–33)
CREAT SERPL-MCNC: 0.6 MG/DL (ref 0.5–1.4)
EOSINOPHIL # BLD AUTO: 0.09 K/UL (ref 0–0.51)
EOSINOPHIL NFR BLD: 3.2 % (ref 0–6.9)
ERYTHROCYTE [DISTWIDTH] IN BLOOD BY AUTOMATED COUNT: 52.2 FL (ref 35.9–50)
GLOBULIN SER CALC-MCNC: 3 G/DL (ref 1.9–3.5)
GLUCOSE SERPL-MCNC: 94 MG/DL (ref 65–99)
HCT VFR BLD AUTO: 26.5 % (ref 37–47)
HGB BLD-MCNC: 8.3 G/DL (ref 12–16)
IMM GRANULOCYTES # BLD AUTO: 0.01 K/UL (ref 0–0.11)
IMM GRANULOCYTES NFR BLD AUTO: 0.4 % (ref 0–0.9)
LYMPHOCYTES # BLD AUTO: 0.68 K/UL (ref 1–4.8)
LYMPHOCYTES NFR BLD: 23.9 % (ref 22–41)
MAGNESIUM SERPL-MCNC: 1.5 MG/DL (ref 1.5–2.5)
MCH RBC QN AUTO: 27.3 PG (ref 27–33)
MCHC RBC AUTO-ENTMCNC: 31.3 G/DL (ref 33.6–35)
MCV RBC AUTO: 87.2 FL (ref 81.4–97.8)
MONOCYTES # BLD AUTO: 0.29 K/UL (ref 0–0.85)
MONOCYTES NFR BLD AUTO: 10.2 % (ref 0–13.4)
NEUTROPHILS # BLD AUTO: 1.77 K/UL (ref 2–7.15)
NEUTROPHILS NFR BLD: 62.3 % (ref 44–72)
NRBC # BLD AUTO: 0 K/UL
NRBC BLD-RTO: 0 /100 WBC
PLATELET # BLD AUTO: 80 K/UL (ref 164–446)
PMV BLD AUTO: 10.5 FL (ref 9–12.9)
POTASSIUM SERPL-SCNC: 3.6 MMOL/L (ref 3.6–5.5)
PROT SERPL-MCNC: 6 G/DL (ref 6–8.2)
RBC # BLD AUTO: 3.04 M/UL (ref 4.2–5.4)
SODIUM SERPL-SCNC: 133 MMOL/L (ref 135–145)
WBC # BLD AUTO: 2.8 K/UL (ref 4.8–10.8)

## 2021-04-21 PROCEDURE — A9270 NON-COVERED ITEM OR SERVICE: HCPCS | Performed by: GENERAL PRACTICE

## 2021-04-21 PROCEDURE — 700102 HCHG RX REV CODE 250 W/ 637 OVERRIDE(OP): Performed by: GENERAL PRACTICE

## 2021-04-21 PROCEDURE — 700111 HCHG RX REV CODE 636 W/ 250 OVERRIDE (IP): Performed by: STUDENT IN AN ORGANIZED HEALTH CARE EDUCATION/TRAINING PROGRAM

## 2021-04-21 PROCEDURE — A9270 NON-COVERED ITEM OR SERVICE: HCPCS | Performed by: EMERGENCY MEDICINE

## 2021-04-21 PROCEDURE — 700101 HCHG RX REV CODE 250: Performed by: HOSPITALIST

## 2021-04-21 PROCEDURE — 700102 HCHG RX REV CODE 250 W/ 637 OVERRIDE(OP): Performed by: HOSPITALIST

## 2021-04-21 PROCEDURE — 80053 COMPREHEN METABOLIC PANEL: CPT

## 2021-04-21 PROCEDURE — A9270 NON-COVERED ITEM OR SERVICE: HCPCS | Performed by: HOSPITALIST

## 2021-04-21 PROCEDURE — 83735 ASSAY OF MAGNESIUM: CPT

## 2021-04-21 PROCEDURE — 85025 COMPLETE CBC W/AUTO DIFF WBC: CPT

## 2021-04-21 PROCEDURE — 770020 HCHG ROOM/CARE - TELE (206)

## 2021-04-21 PROCEDURE — 700102 HCHG RX REV CODE 250 W/ 637 OVERRIDE(OP): Performed by: EMERGENCY MEDICINE

## 2021-04-21 PROCEDURE — 36415 COLL VENOUS BLD VENIPUNCTURE: CPT

## 2021-04-21 PROCEDURE — 99232 SBSQ HOSP IP/OBS MODERATE 35: CPT | Performed by: GENERAL PRACTICE

## 2021-04-21 RX ORDER — CHLORDIAZEPOXIDE HYDROCHLORIDE 5 MG/1
5 CAPSULE, GELATIN COATED ORAL DAILY
Status: DISCONTINUED | OUTPATIENT
Start: 2021-04-22 | End: 2021-04-22 | Stop reason: HOSPADM

## 2021-04-21 RX ORDER — CYCLOBENZAPRINE HCL 10 MG
10 TABLET ORAL 3 TIMES DAILY PRN
Status: DISCONTINUED | OUTPATIENT
Start: 2021-04-21 | End: 2021-04-22 | Stop reason: HOSPADM

## 2021-04-21 RX ORDER — GABAPENTIN 100 MG/1
200 CAPSULE ORAL 3 TIMES DAILY
Status: DISCONTINUED | OUTPATIENT
Start: 2021-04-21 | End: 2021-04-22 | Stop reason: HOSPADM

## 2021-04-21 RX ORDER — KETOROLAC TROMETHAMINE 30 MG/ML
30 INJECTION, SOLUTION INTRAMUSCULAR; INTRAVENOUS EVERY 6 HOURS PRN
Status: DISCONTINUED | OUTPATIENT
Start: 2021-04-21 | End: 2021-04-21

## 2021-04-21 RX ORDER — CHLORDIAZEPOXIDE HYDROCHLORIDE 5 MG/1
5 CAPSULE, GELATIN COATED ORAL 2 TIMES DAILY
Status: COMPLETED | OUTPATIENT
Start: 2021-04-21 | End: 2021-04-21

## 2021-04-21 RX ADMIN — CHLORDIAZEPOXIDE HYDROCHLORIDE 5 MG: 5 CAPSULE ORAL at 06:36

## 2021-04-21 RX ADMIN — MAGNESIUM GLUCONATE 500 MG ORAL TABLET 400 MG: 500 TABLET ORAL at 17:03

## 2021-04-21 RX ADMIN — FOLIC ACID 1 MG: 1 TABLET ORAL at 06:35

## 2021-04-21 RX ADMIN — DIVALPROEX SODIUM 250 MG: 250 TABLET, DELAYED RELEASE ORAL at 15:31

## 2021-04-21 RX ADMIN — LORAZEPAM 2 MG: 2 TABLET ORAL at 02:03

## 2021-04-21 RX ADMIN — CHLORDIAZEPOXIDE HYDROCHLORIDE 5 MG: 5 CAPSULE ORAL at 17:03

## 2021-04-21 RX ADMIN — GABAPENTIN 200 MG: 100 CAPSULE ORAL at 17:04

## 2021-04-21 RX ADMIN — THERA TABS 1 TABLET: TAB at 06:36

## 2021-04-21 RX ADMIN — LORAZEPAM 1 MG: 1 TABLET ORAL at 06:35

## 2021-04-21 RX ADMIN — LORAZEPAM 1 MG: 1 TABLET ORAL at 22:01

## 2021-04-21 RX ADMIN — GABAPENTIN 100 MG: 100 CAPSULE ORAL at 06:36

## 2021-04-21 RX ADMIN — GABAPENTIN 200 MG: 100 CAPSULE ORAL at 11:46

## 2021-04-21 RX ADMIN — DIVALPROEX SODIUM 250 MG: 250 TABLET, DELAYED RELEASE ORAL at 06:36

## 2021-04-21 RX ADMIN — Medication 100 MG: at 06:35

## 2021-04-21 RX ADMIN — LORAZEPAM 0.5 MG: 0.5 TABLET ORAL at 10:03

## 2021-04-21 RX ADMIN — DIVALPROEX SODIUM 250 MG: 250 TABLET, DELAYED RELEASE ORAL at 22:00

## 2021-04-21 RX ADMIN — ENOXAPARIN SODIUM 40 MG: 40 INJECTION SUBCUTANEOUS at 06:35

## 2021-04-21 RX ADMIN — LIDOCAINE 1 PATCH: 50 PATCH TOPICAL at 17:04

## 2021-04-21 RX ADMIN — MAGNESIUM GLUCONATE 500 MG ORAL TABLET 400 MG: 500 TABLET ORAL at 06:36

## 2021-04-21 RX ADMIN — CYCLOBENZAPRINE 10 MG: 10 TABLET, FILM COATED ORAL at 11:46

## 2021-04-21 ASSESSMENT — LIFESTYLE VARIABLES
AGITATION: SOMEWHAT MORE THAN NORMAL ACTIVITY
NAUSEA AND VOMITING: NO NAUSEA AND NO VOMITING
AUDITORY DISTURBANCES: VERY MILD HARSHNESS OR ABILITY TO FRIGHTEN
NAUSEA AND VOMITING: NO NAUSEA AND NO VOMITING
PAROXYSMAL SWEATS: NO SWEAT VISIBLE
PAROXYSMAL SWEATS: NO SWEAT VISIBLE
AUDITORY DISTURBANCES: NOT PRESENT
AGITATION: *
ANXIETY: MILDLY ANXIOUS
HEADACHE, FULLNESS IN HEAD: NOT PRESENT
ORIENTATION AND CLOUDING OF SENSORIUM: ORIENTED AND CAN DO SERIAL ADDITIONS
ANXIETY: *
AUDITORY DISTURBANCES: NOT PRESENT
ANXIETY: *
AUDITORY DISTURBANCES: NOT PRESENT
TOTAL SCORE: 9
VISUAL DISTURBANCES: NOT PRESENT
HEADACHE, FULLNESS IN HEAD: MODERATE
TREMOR: *
HEADACHE, FULLNESS IN HEAD: NOT PRESENT
PAROXYSMAL SWEATS: NO SWEAT VISIBLE
TOTAL SCORE: VERY MILD ITCHING, PINS AND NEEDLES SENSATION, BURNING OR NUMBNESS
TREMOR: *
NAUSEA AND VOMITING: *
TREMOR: *
TOTAL SCORE: 7
ORIENTATION AND CLOUDING OF SENSORIUM: ORIENTED AND CAN DO SERIAL ADDITIONS
AGITATION: SOMEWHAT MORE THAN NORMAL ACTIVITY
ORIENTATION AND CLOUDING OF SENSORIUM: DATE DISORIENTATION BY MORE THAN TWO CALENDAR DAYS
VISUAL DISTURBANCES: NOT PRESENT
PAROXYSMAL SWEATS: NO SWEAT VISIBLE
TREMOR: *
AUDITORY DISTURBANCES: NOT PRESENT
ANXIETY: MILDLY ANXIOUS
VISUAL DISTURBANCES: NOT PRESENT
TREMOR: TREMOR NOT VISIBLE BUT CAN BE FELT, FINGERTIP TO FINGERTIP
TOTAL SCORE: 4
VISUAL DISTURBANCES: NOT PRESENT
TOTAL SCORE: 12
NAUSEA AND VOMITING: NO NAUSEA AND NO VOMITING
HEADACHE, FULLNESS IN HEAD: MILD
TOTAL SCORE: 10
ORIENTATION AND CLOUDING OF SENSORIUM: DATE DISORIENTATION BY MORE THAN TWO CALENDAR DAYS
ORIENTATION AND CLOUDING OF SENSORIUM: ORIENTED AND CAN DO SERIAL ADDITIONS
TREMOR: *
AGITATION: SOMEWHAT MORE THAN NORMAL ACTIVITY
ANXIETY: MILDLY ANXIOUS
TOTAL SCORE: 4
AUDITORY DISTURBANCES: NOT PRESENT
HEADACHE, FULLNESS IN HEAD: NOT PRESENT
PAROXYSMAL SWEATS: NO SWEAT VISIBLE
VISUAL DISTURBANCES: NOT PRESENT
AGITATION: SOMEWHAT MORE THAN NORMAL ACTIVITY
AGITATION: *
ANXIETY: MILDLY ANXIOUS
PAROXYSMAL SWEATS: NO SWEAT VISIBLE
HEADACHE, FULLNESS IN HEAD: MILD
NAUSEA AND VOMITING: NO NAUSEA AND NO VOMITING
ORIENTATION AND CLOUDING OF SENSORIUM: CANNOT DO SERIAL ADDITIONS OR IS UNCERTAIN ABOUT DATE
VISUAL DISTURBANCES: NOT PRESENT
NAUSEA AND VOMITING: NO NAUSEA AND NO VOMITING

## 2021-04-21 ASSESSMENT — PAIN DESCRIPTION - PAIN TYPE
TYPE: ACUTE PAIN

## 2021-04-21 ASSESSMENT — ENCOUNTER SYMPTOMS: BACK PAIN: 1

## 2021-04-21 NOTE — PROGRESS NOTES
Hospital Medicine Daily Progress Note    Date of Service  4/21/2021    Chief Complaint  61 y.o. female admitted 4/18/2021 for overdose    Hospital Course  This is a 61 year old female with PMHx of alcohol use disorder, substance use disorder, tobacco use disorder, cirrhosis,hypertension, CAD,  COPD, GERD, chronic back pain, depression and prior detox from heroin and methamphetamine 2 months ago.  She was admitted 4/18/2021 after an attempted suicide, taking ~10 tabs of clonidine while drinking alcohol. She was in the ICU for levophed drip.      Interval Problem Update  Patient was seen and examined at bedside.  Patient reports that she has some back pain.  Patient is able to tolerate food, denied any nausea vomiting or diarrhea.    Patient is requesting for pain medications, we will refrain from any opioids at this time.  I have increased her Neurontin and added muscle relaxers for her back pain.    Her CIWA was 7 this morning.  Anticipate medical clearance in 1 to 2 days, for transfer to inpatient psychiatry.    Consultants/Specialty  Critical care  Psychiatry    Code Status  Full Code    Disposition  Legal hold    Review of Systems  Review of Systems   Musculoskeletal: Positive for back pain.   All other systems reviewed and are negative.       Physical Exam  Temp:  [36.8 °C (98.2 °F)-37.6 °C (99.6 °F)] 36.8 °C (98.2 °F)  Pulse:  [65-99] 85  Resp:  [18-22] 20  BP: (120-161)/(57-95) 152/78  SpO2:  [92 %-95 %] 94 %    Physical Exam  Vitals reviewed.   Constitutional:       Appearance: Normal appearance. She is not diaphoretic.   HENT:      Head: Normocephalic and atraumatic.      Nose: Nose normal.      Mouth/Throat:      Pharynx: No oropharyngeal exudate.   Eyes:      General: No scleral icterus.        Right eye: No discharge.         Left eye: No discharge.      Extraocular Movements: Extraocular movements intact.      Conjunctiva/sclera: Conjunctivae normal.   Cardiovascular:      Rate and Rhythm: Normal rate  and regular rhythm.      Pulses:           Radial pulses are 2+ on the right side and 2+ on the left side.        Dorsalis pedis pulses are 2+ on the right side and 2+ on the left side.      Heart sounds: No murmur.   Pulmonary:      Effort: Pulmonary effort is normal. No respiratory distress.      Breath sounds: Normal breath sounds. No wheezing or rales.   Abdominal:      General: Bowel sounds are normal. There is no distension.      Palpations: Abdomen is soft.   Musculoskeletal:         General: No swelling or tenderness.      Cervical back: No tenderness. No muscular tenderness.      Right lower leg: No edema.      Left lower leg: No edema.   Lymphadenopathy:      Cervical: No cervical adenopathy.   Skin:     Coloration: Skin is not jaundiced or pale.   Neurological:      General: No focal deficit present.      Mental Status: She is alert and oriented to person, place, and time. Mental status is at baseline.      Cranial Nerves: No cranial nerve deficit.   Psychiatric:         Mood and Affect: Mood is depressed.         Speech: Speech is delayed.         Behavior: Behavior is slowed.         Thought Content: Thought content does not include suicidal ideation.         Fluids    Intake/Output Summary (Last 24 hours) at 4/21/2021 1150  Last data filed at 4/21/2021 0630  Gross per 24 hour   Intake 1475 ml   Output --   Net 1475 ml       Laboratory  Recent Labs     04/19/21 0415 04/20/21 0315 04/21/21  0340   WBC 1.8* 2.6* 2.8*   RBC 3.14* 3.13* 3.04*   HEMOGLOBIN 8.5* 8.5* 8.3*   HEMATOCRIT 27.3* 27.6* 26.5*   MCV 86.9 88.2 87.2   MCH 27.1 27.2 27.3   MCHC 31.1* 30.8* 31.3*   RDW 50.6* 52.3* 52.2*   PLATELETCT 88* 75* 80*   MPV 10.4 10.4 10.5     Recent Labs     04/19/21 0415 04/20/21  0315 04/21/21  0340   SODIUM 136 137 133*   POTASSIUM 4.4 4.3 3.6   CHLORIDE 103 102 101   CO2 27 28 28   GLUCOSE 115* 97 94   BUN 10 9 7*   CREATININE 0.51 0.66 0.60   CALCIUM 8.6 8.5 8.5                   Imaging  CT-HEAD W/O    Final Result         1.  No acute intracranial abnormality is identified, there are nonspecific white matter changes, commonly associated with small vessel ischemic disease.  Associated mild cerebral atrophy is noted.   2.  Hyperdensity in the posterior oropharynx, likely ingested material. Both   3.  Atherosclerosis.      DX-CHEST-PORTABLE (1 VIEW)   Final Result         1.  Interstitial pulmonary parenchymal prominence suggest chronic underlying lung disease, component of interstitial edema and/or infiltrates not excluded.   2.  Cardiomegaly   3.  Atherosclerosis           Assessment/Plan  * Hypotension due to drugs- (present on admission)  Assessment & Plan  Secondary to clonidine overdose.    Resolved, monitoring vitals.    Alcohol dependence with intoxication (HCC)- (present on admission)  Assessment & Plan  Patient continues to remain frustrated with inability to refrain from alcohol.  Counseled patient on use of supportive groups such as Alcoholics Anonymous.    Suicide attempt by drug overdose (HCC)- (present on admission)  Assessment & Plan  Suicide attempt  On legal Hold    Clonidine overdose- (present on admission)  Assessment & Plan  Ms. Aceves was here with a confirmed overdose of clonidine; she has no other known overdoses.    The patient later states that she might of taken trazodone as well, unclear, monitor  Psychiatry consulting  Legal hold.  Watch for rebound hypertension    Acquired pancytopenia (HCC)- (present on admission)  Assessment & Plan  Likely secondary to bone marrow suppression in setting of chronic alcoholism.  Question of liver disease in conjunction, check hepatitis panel  No current signs of infection.  Continue to monitor closely.    Hypokalemia- (present on admission)  Assessment & Plan  Replace for potassium goal greater than 4.0.  Magnesium goal greater than 2.0.  4/20 K:4.3    Hypertension- (present on admission)  Assessment & Plan  History of hypertension.    S/p  hypotension from clonidine overdose  Has prn labetalol and hydralazine  If becomes more consistant initiate other oral BP med  monitor vitals.    Cirrhosis with alcoholism (HCC)- (present on admission)  Assessment & Plan  Continue with cessation of alcohol encouragement    Heart disease- (present on admission)  Assessment & Plan  Unspecified heart disease history per patient.  Mild cardiomegaly present on chest x-ray.  No signs of acute heart failure.  Continue to monitor.    COPD (chronic obstructive pulmonary disease) (Former[facility])- (present on admission)  Assessment & Plan  Currently stable not on inhalers or oxygen.  Quit smoking 12 years ago.  Continue to monitor.  RT protocol if needed    GERD (gastroesophageal reflux disease)- (present on admission)  Assessment & Plan  As per history he has history of GERD.  No current symptoms.  Continue to monitor.    Scoliosis- (present on admission)  Assessment & Plan  As per history has history of scoliosis that affects her ambulation.  lidoderm patch added 4/20  Continue to monitor.  Consider PT and OT consults.       VTE prophylaxis: SCDs, Lovenox

## 2021-04-21 NOTE — PROGRESS NOTES
Assumed care of patient at 0715, received bedside report from night shift RN. Bed is locked and in lowest position with call light within reach. Treaded socks in place. Patient updated on plan of care, no complaints or pain at this time. White board updated. Pt not responding to ano questions, however is alert. Patient's breathing pattern is unlabored. Safety sitter at bedisde. Safety checklist in place. Tele monitor in place and cardiac rhythm being monitored. All needs met at this time.

## 2021-04-21 NOTE — CARE PLAN
Problem: Safety  Goal: Will remain free from injury  Outcome: PROGRESSING AS EXPECTED     Problem: Alcohol Withdrawal  Goal: Optimal Outcome for the Alcohol Withdrawal Patient  Outcome: PROGRESSING AS EXPECTED

## 2021-04-21 NOTE — CONSULTS
Brief Behavioral Health Note:    Attempted to evaluate patient, however sleeping and very difficult to arouse. Sitter at bedside.  Will follow up when patient is alert and able to engage in the interview process.    All previous recommendations remain intact.    Thanks you,    Tresa Song, Ph.D., [facility]

## 2021-04-21 NOTE — PROGRESS NOTES
Monitor Summary:  .14/.08/.32  SR w/ notched P-waves: 85-99  Rare PVC      ----------  12 hour chart check.

## 2021-04-21 NOTE — HOSPITAL COURSE
This is a 61 year old female with PMHx of alcohol use disorder, substance use disorder, tobacco use disorder, cirrhosis,hypertension, CAD,  COPD, GERD, chronic back pain, depression and prior detox from heroin and methamphetamine 2 months ago.  She was admitted 4/18/2021 after an attempted suicide, taking ~10 tabs of clonidine while drinking alcohol. She was in the ICU for levophed drip.

## 2021-04-21 NOTE — CARE PLAN
Problem: Alcohol Withdrawal  Goal: Optimal Outcome for the Alcohol Withdrawal Patient  Outcome: PROGRESSING AS EXPECTED     Problem: Psychosocial Needs:  Goal: Level of anxiety will decrease  Outcome: PROGRESSING SLOWER THAN EXPECTED     Problem: Pain Management  Goal: Pain level will decrease to patient's comfort goal  Outcome: PROGRESSING SLOWER THAN EXPECTED     Problem: Discharge Barriers/Planning  Goal: Patient's continuum of care needs will be met  Outcome: PROGRESSING SLOWER THAN EXPECTED     Problem: Knowledge Deficit  Goal: Knowledge of disease process/condition, treatment plan, diagnostic tests, and medications will improve  Outcome: PROGRESSING SLOWER THAN EXPECTED     Problem: Safety  Goal: Will remain free from injury  Outcome: PROGRESSING AS EXPECTED     Problem: Communication  Goal: The ability to communicate needs accurately and effectively will improve  Outcome: PROGRESSING AS EXPECTED

## 2021-04-21 NOTE — PROGRESS NOTES
Assumed care for patient. Bedside report done with Farzaneh CLIFFORD. Pt assisted to the bathroom and back to bed, updated on poc. 1:1 Sitter at bedside. Will continue to monitor.

## 2021-04-22 VITALS
HEIGHT: 62 IN | OXYGEN SATURATION: 91 % | DIASTOLIC BLOOD PRESSURE: 91 MMHG | BODY MASS INDEX: 25.15 KG/M2 | HEART RATE: 80 BPM | RESPIRATION RATE: 20 BRPM | SYSTOLIC BLOOD PRESSURE: 160 MMHG | TEMPERATURE: 98.1 F | WEIGHT: 136.69 LBS

## 2021-04-22 LAB
ERYTHROCYTE [DISTWIDTH] IN BLOOD BY AUTOMATED COUNT: 53 FL (ref 35.9–50)
HCT VFR BLD AUTO: 29.5 % (ref 37–47)
HGB BLD-MCNC: 9.3 G/DL (ref 12–16)
MCH RBC QN AUTO: 27.7 PG (ref 27–33)
MCHC RBC AUTO-ENTMCNC: 31.5 G/DL (ref 33.6–35)
MCV RBC AUTO: 87.8 FL (ref 81.4–97.8)
PLATELET # BLD AUTO: 93 K/UL (ref 164–446)
PMV BLD AUTO: 9.8 FL (ref 9–12.9)
RBC # BLD AUTO: 3.36 M/UL (ref 4.2–5.4)
WBC # BLD AUTO: 2.9 K/UL (ref 4.8–10.8)

## 2021-04-22 PROCEDURE — 85027 COMPLETE CBC AUTOMATED: CPT

## 2021-04-22 PROCEDURE — 700102 HCHG RX REV CODE 250 W/ 637 OVERRIDE(OP): Performed by: HOSPITALIST

## 2021-04-22 PROCEDURE — A9270 NON-COVERED ITEM OR SERVICE: HCPCS | Performed by: STUDENT IN AN ORGANIZED HEALTH CARE EDUCATION/TRAINING PROGRAM

## 2021-04-22 PROCEDURE — 99239 HOSP IP/OBS DSCHRG MGMT >30: CPT | Performed by: GENERAL PRACTICE

## 2021-04-22 PROCEDURE — 700102 HCHG RX REV CODE 250 W/ 637 OVERRIDE(OP): Performed by: STUDENT IN AN ORGANIZED HEALTH CARE EDUCATION/TRAINING PROGRAM

## 2021-04-22 PROCEDURE — A9270 NON-COVERED ITEM OR SERVICE: HCPCS | Performed by: HOSPITALIST

## 2021-04-22 PROCEDURE — 36415 COLL VENOUS BLD VENIPUNCTURE: CPT

## 2021-04-22 PROCEDURE — 700102 HCHG RX REV CODE 250 W/ 637 OVERRIDE(OP): Performed by: GENERAL PRACTICE

## 2021-04-22 PROCEDURE — 700111 HCHG RX REV CODE 636 W/ 250 OVERRIDE (IP): Performed by: STUDENT IN AN ORGANIZED HEALTH CARE EDUCATION/TRAINING PROGRAM

## 2021-04-22 PROCEDURE — A9270 NON-COVERED ITEM OR SERVICE: HCPCS | Performed by: EMERGENCY MEDICINE

## 2021-04-22 PROCEDURE — 700102 HCHG RX REV CODE 250 W/ 637 OVERRIDE(OP): Performed by: EMERGENCY MEDICINE

## 2021-04-22 PROCEDURE — A9270 NON-COVERED ITEM OR SERVICE: HCPCS | Performed by: GENERAL PRACTICE

## 2021-04-22 RX ORDER — DIVALPROEX SODIUM 250 MG/1
250 TABLET, DELAYED RELEASE ORAL EVERY 8 HOURS
Qty: 90 TABLET | Status: SHIPPED
Start: 2021-04-22

## 2021-04-22 RX ORDER — CYCLOBENZAPRINE HCL 10 MG
10 TABLET ORAL 3 TIMES DAILY PRN
Qty: 30 TABLET | Refills: 0 | Status: SHIPPED
Start: 2021-04-22

## 2021-04-22 RX ORDER — GABAPENTIN 100 MG/1
200 CAPSULE ORAL 3 TIMES DAILY
Qty: 90 CAPSULE | Status: SHIPPED
Start: 2021-04-22

## 2021-04-22 RX ORDER — LIDOCAINE 50 MG/G
1 PATCH TOPICAL EVERY 24 HOURS
Qty: 10 PATCH | Status: SHIPPED
Start: 2021-04-22

## 2021-04-22 RX ADMIN — DIVALPROEX SODIUM 250 MG: 250 TABLET, DELAYED RELEASE ORAL at 06:25

## 2021-04-22 RX ADMIN — GABAPENTIN 200 MG: 100 CAPSULE ORAL at 12:02

## 2021-04-22 RX ADMIN — ENOXAPARIN SODIUM 40 MG: 40 INJECTION SUBCUTANEOUS at 06:23

## 2021-04-22 RX ADMIN — FOLIC ACID 1 MG: 1 TABLET ORAL at 06:23

## 2021-04-22 RX ADMIN — CHLORDIAZEPOXIDE HYDROCHLORIDE 5 MG: 5 CAPSULE ORAL at 06:23

## 2021-04-22 RX ADMIN — MAGNESIUM GLUCONATE 500 MG ORAL TABLET 400 MG: 500 TABLET ORAL at 06:23

## 2021-04-22 RX ADMIN — Medication 100 MG: at 06:23

## 2021-04-22 RX ADMIN — GABAPENTIN 200 MG: 100 CAPSULE ORAL at 06:23

## 2021-04-22 RX ADMIN — THERA TABS 1 TABLET: TAB at 06:23

## 2021-04-22 ASSESSMENT — LIFESTYLE VARIABLES
AGITATION: SOMEWHAT MORE THAN NORMAL ACTIVITY
TREMOR: TREMOR NOT VISIBLE BUT CAN BE FELT, FINGERTIP TO FINGERTIP
AGITATION: SOMEWHAT MORE THAN NORMAL ACTIVITY
TREMOR: TREMOR NOT VISIBLE BUT CAN BE FELT, FINGERTIP TO FINGERTIP
HEADACHE, FULLNESS IN HEAD: NOT PRESENT
HAVE YOU EVER FELT YOU SHOULD CUT DOWN ON YOUR DRINKING: YES
HEADACHE, FULLNESS IN HEAD: NOT PRESENT
HOW MANY TIMES IN THE PAST YEAR HAVE YOU HAD 5 OR MORE DRINKS IN A DAY: 365
DOES PATIENT WANT TO TALK TO SOMEONE ABOUT QUITTING: YES
HEADACHE, FULLNESS IN HEAD: NOT PRESENT
ON A TYPICAL DAY WHEN YOU DRINK ALCOHOL HOW MANY DRINKS DO YOU HAVE: 6
ANXIETY: NO ANXIETY (AT EASE)
EVER FELT BAD OR GUILTY ABOUT YOUR DRINKING: YES
NAUSEA AND VOMITING: NO NAUSEA AND NO VOMITING
AUDITORY DISTURBANCES: NOT PRESENT
ORIENTATION AND CLOUDING OF SENSORIUM: ORIENTED AND CAN DO SERIAL ADDITIONS
VISUAL DISTURBANCES: NOT PRESENT
AGITATION: NORMAL ACTIVITY
NAUSEA AND VOMITING: NO NAUSEA AND NO VOMITING
TOTAL SCORE: 3
NAUSEA AND VOMITING: NO NAUSEA AND NO VOMITING
EVER HAD A DRINK FIRST THING IN THE MORNING TO STEADY YOUR NERVES TO GET RID OF A HANGOVER: YES
ANXIETY: NO ANXIETY (AT EASE)
TOTAL SCORE: 4
HAVE PEOPLE ANNOYED YOU BY CRITICIZING YOUR DRINKING: YES
AVERAGE NUMBER OF DAYS PER WEEK YOU HAVE A DRINK CONTAINING ALCOHOL: 7
ANXIETY: NO ANXIETY (AT EASE)
PAROXYSMAL SWEATS: NO SWEAT VISIBLE
TOTAL SCORE: 4
TOTAL SCORE: 1
ORIENTATION AND CLOUDING OF SENSORIUM: ORIENTED AND CAN DO SERIAL ADDITIONS
VISUAL DISTURBANCES: NOT PRESENT
DOES PATIENT WANT TO STOP DRINKING: YES
AUDITORY DISTURBANCES: NOT PRESENT
TOTAL SCORE: 4
PAROXYSMAL SWEATS: BARELY PERCEPTIBLE SWEATING. PALMS MOIST
VISUAL DISTURBANCES: NOT PRESENT
CONSUMPTION TOTAL: POSITIVE
AUDITORY DISTURBANCES: NOT PRESENT
ORIENTATION AND CLOUDING OF SENSORIUM: CANNOT DO SERIAL ADDITIONS OR IS UNCERTAIN ABOUT DATE
PAROXYSMAL SWEATS: BARELY PERCEPTIBLE SWEATING. PALMS MOIST
TREMOR: TREMOR NOT VISIBLE BUT CAN BE FELT, FINGERTIP TO FINGERTIP
ALCOHOL_USE: YES
TOTAL SCORE: 4

## 2021-04-22 ASSESSMENT — PATIENT HEALTH QUESTIONNAIRE - PHQ9
8. MOVING OR SPEAKING SO SLOWLY THAT OTHER PEOPLE COULD HAVE NOTICED. OR THE OPPOSITE, BEING SO FIGETY OR RESTLESS THAT YOU HAVE BEEN MOVING AROUND A LOT MORE THAN USUAL: NEARLY EVERY DAY
4. FEELING TIRED OR HAVING LITTLE ENERGY: MORE THAN HALF THE DAYS
3. TROUBLE FALLING OR STAYING ASLEEP OR SLEEPING TOO MUCH: MORE THAN HALF THE DAYS
9. THOUGHTS THAT YOU WOULD BE BETTER OFF DEAD, OR OF HURTING YOURSELF: MORE THAN HALF THE DAYS
2. FEELING DOWN, DEPRESSED, IRRITABLE, OR HOPELESS: NEARLY EVERY DAY
5. POOR APPETITE OR OVEREATING: MORE THAN HALF THE DAYS
1. LITTLE INTEREST OR PLEASURE IN DOING THINGS: MORE THAN HALF THE DAYS
6. FEELING BAD ABOUT YOURSELF - OR THAT YOU ARE A FAILURE OR HAVE LET YOURSELF OR YOUR FAMILY DOWN: SEVERAL DAYS
SUM OF ALL RESPONSES TO PHQ9 QUESTIONS 1 AND 2: 5

## 2021-04-22 NOTE — DISCHARGE INSTRUCTIONS
Discharge Instructions    Discharged to other by ambulance with escort. Discharged via ambulance, hospital escort: Yes.  Special equipment needed: Not Applicable    Be sure to schedule a follow-up appointment with your primary care doctor or any specialists as instructed.     Discharge Plan:   Diet Plan: Discussed  Activity Level: Discussed  Confirmed Follow up Appointment: Patient to Call and Schedule Appointment  Confirmed Symptoms Management: Discussed  Medication Reconciliation Updated: Yes    I understand that a diet low in cholesterol, fat, and sodium is recommended for good health. Unless I have been given specific instructions below for another diet, I accept this instruction as my diet prescription.   Other diet: Regular    Special Instructions: None    · Is patient discharged on Warfarin / Coumadin?   No     Depression / Suicide Risk    As you are discharged from this [facility] facility, it is important to learn how to keep safe from harming yourself.    Recognize the warning signs:  · Abrupt changes in personality, positive or negative- including increase in energy   · Giving away possessions  · Change in eating patterns- significant weight changes-  positive or negative  · Change in sleeping patterns- unable to sleep or sleeping all the time   · Unwillingness or inability to communicate  · Depression  · Unusual sadness, discouragement and loneliness  · Talk of wanting to die  · Neglect of personal appearance   · Rebelliousness- reckless behavior  · Withdrawal from people/activities they love  · Confusion- inability to concentrate     If you or a loved one observes any of these behaviors or has concerns about self-harm, here's what you can do:  · Talk about it- your feelings and reasons for harming yourself  · Remove any means that you might use to hurt yourself (examples: pills, rope, extension cords, firearm)  · Get professional help from the community (Mental Health, Substance Abuse, psychological  counseling)  · Do not be alone:Call your Safe Contact- someone whom you trust who will be there for you.  · Call your local CRISIS HOTLINE 177-5586 or 082-583-0434  · Call your local Children's Mobile Crisis Response Team [place] (602) 291-8044 or www.Mape  · Call the toll free National Suicide Prevention Hotlines   · National Suicide Prevention Lifeline 499-143-DGYT (4545)  · National Hope Line Network 800-SUICIDE (883-3045)

## 2021-04-22 NOTE — PROGRESS NOTES
Received bedside report from RN Estela, pt care assumed, VSS, pt assessment complete. Pt AAOx2 disoriented to time and place, c/o 3/10 pain at this time. No signs of acute distress noted at this time. POC discussed with pt and verbalizes no questions. Pt denies any additional needs at this time. Bed in lowest position, bed alarm on, pt educated on fall risk and verbalized understanding, call light within reach, hourly rounding initiated. Patient is on a legal 2000, Stop sign and L2K checklist completed. 1-1 PSA sitter at bedside.

## 2021-04-22 NOTE — PROGRESS NOTES
ANO 4. Patient given discharge instructions regarding follow up appointment, diet, activity, prescriptions, and when to call a provider. Patient demonstrated verbal understanding of information given. Pt understanding that she is still under a legal hold and will be transferred to an inpatient psych facility. Tele box off, IV discharged, transported in wheel chair off of the unit by [facility] with all belongings.

## 2021-04-22 NOTE — CARE PLAN
Problem: Communication  Goal: The ability to communicate needs accurately and effectively will improve  Outcome: PROGRESSING AS EXPECTED     Problem: Knowledge Deficit  Goal: Knowledge of disease process/condition, treatment plan, diagnostic tests, and medications will improve  Outcome: PROGRESSING AS EXPECTED     Problem: Psychosocial Needs:  Goal: Level of anxiety will decrease  Outcome: PROGRESSING AS EXPECTED     Problem: Alcohol Withdrawal  Goal: Optimal Outcome for the Alcohol Withdrawal Patient  Outcome: MET

## 2021-04-22 NOTE — DISCHARGE PLANNING
Agency/Facility Name: [facility]   Spoke To: Danica  Outcome: Patient Accepted    Spoke to Rosa @ [facility]    Transport is scheduled for 04/22/21 @1215 going to [facility].    Right Faxed Extension of Legal Hold to facility and copy printed for [phone]    Right Faxed Extension of Legal Hold to [facility] for transport@[phone]

## 2021-04-22 NOTE — PROGRESS NOTES
Pt asking about belongings such as clothing and her glasses. No documentation can be found regarding her having any belongings at any interval in the hospital. This RN checked the room, the outside nurse , personally went down to the er and checked lockers, checked safe keeping on the unit, and called security safe keeping. Pt was given clothing from ER donations.

## 2021-04-22 NOTE — DISCHARGE PLANNING
Per MD pt is medically clear. Will send out Psych referrals for pt.     LSW to assist as needed and monitor for barriers to discharge.

## 2021-04-22 NOTE — DISCHARGE PLANNING
Received call from MELONIE Gtz regarding original legal hold missing from pt's chart. Went up to pt's previous room on T6 and found original legal hold in pt's drawer from previous room. Handed original legal hold to JAYE Childs, notified MELONIE Gtz.

## 2021-04-22 NOTE — PROGRESS NOTES
Assumed care of patient at 0715, received bedside report from night shift RN. Bed is locked and in lowest position with call light within reach. Treaded socks in place. Patient updated on plan of care, no complaints or pain at this time. White board updated. Pt A&O. Patient's breathing pattern is unlabored. Tele monitor in place and cardiac rhythm being monitored. All needs met at this time.

## 2021-04-22 NOTE — DISCHARGE PLANNING
Received Order in Response to Request for Court Ordered Involuntary Admission from the court continuing pt until 4/29 at 0900. Scanned copy of court order continuance into pt's chart.

## 2021-04-22 NOTE — DISCHARGE PLANNING
Agency/Facility Name: [facility]  Referral sent per Choice form @ [phone]    Right Faxed Extension of Legal Hold to Facilities@[phone]

## 2021-04-22 NOTE — DISCHARGE SUMMARY
Discharge Summary    CHIEF COMPLAINT ON ADMISSION  Chief Complaint   Patient presents with   • ALOC     Possible OD on Clonidine and ETOH per son   • Suicidal Ideation       Reason for Admission  Suicide attempt    CODE STATUS  Full Code    HPI & HOSPITAL COURSE  This is a 61 year old female with PMHx of alcohol use disorder, substance use disorder, tobacco use disorder, cirrhosis,hypertension, CAD,  COPD, GERD, chronic back pain, depression and prior detox from heroin and methamphetamine 2 months ago.  She was admitted 4/18/2021 after an attempted suicide, taking ~10 tabs of clonidine while drinking alcohol. She was in the ICU for levophed drip.    Patient's CIWA this morning is less than 3. She is medically clear for transfer to inpatient psychiatry.    Therefore, she is discharged in fair and stable condition to a psychiatric hospital.    The patient met 2-midnight criteria for an inpatient stay at the time of discharge.      FOLLOW UP ITEMS POST DISCHARGE  PCP  Psychiatry    DISCHARGE DIAGNOSES  Principal Problem (Resolved):    Hypotension due to drugs POA: Yes  Active Problems:    Suicide attempt by drug overdose (HCC) POA: Yes    Alcohol dependence with intoxication (HCC) POA: Yes    COPD (chronic obstructive pulmonary disease) (HCC) POA: Yes    Heart disease (Chronic) POA: Yes    Cirrhosis with alcoholism (HCC) POA: Yes    Hypertension POA: Yes    Hypokalemia POA: Yes    Acquired pancytopenia (HCC) POA: Yes    Scoliosis (Chronic) POA: Yes    GERD (gastroesophageal reflux disease) (Chronic) POA: Yes  Resolved Problems:    Clonidine overdose POA: Yes      FOLLOW UP  No follow-up provider specified.    MEDICATIONS ON DISCHARGE     Medication List      START taking these medications      Instructions   cyclobenzaprine 10 mg Tabs  Commonly known as: Flexeril   Take 1 tablet by mouth 3 times a day as needed for Muscle Spasms.  Dose: 10 mg     divalproex 250 MG Tbec  Commonly known as: DEPAKOTE   Take 1 tablet by  mouth every 8 hours.  Dose: 250 mg     gabapentin 100 MG Caps  Commonly known as: NEURONTIN   Take 2 Capsules by mouth 3 times a day.  Dose: 200 mg     lidocaine 5 % Ptch  Commonly known as: LIDODERM   Place 1 Patch on the skin every 24 hours.  Dose: 1 Patch            Allergies  No Known Allergies    DIET  Orders Placed This Encounter   Procedures   • Diet Order Diet: Full Liquid; Tray Modifications (optional): No Sharps (Paperware)     Standing Status:   Standing     Number of Occurrences:   1     Order Specific Question:   Diet:     Answer:   Full Liquid [11]     Order Specific Question:   Tray Modifications (optional)     Answer:   No Sharps (Paperware)       ACTIVITY  As tolerated.  Weight bearing as tolerated    LINES, DRAINS, AND WOUNDS  This is an automated list. Peripheral IVs will be removed prior to discharge.  Peripheral IV 04/18/21 20 G Right Antecubital (Active)   Site Assessment Clean;Dry;Intact 04/21/21 2000   Dressing Type Occlusive;Transparent 04/21/21 2000   Line Status Infusing 04/21/21 2000   Dressing Status Clean;Dry;Intact 04/21/21 2000   Dressing Intervention N/A 04/21/21 2000   Dressing Change Due 04/24/21 04/19/21 0400   Date Primary Tubing Changed 04/18/21 04/18/21 2100   NEXT Primary Tubing Change  04/20/32 04/18/21 0800   Infiltration Grading ([facility] 0 04/21/21 2000   Phlebitis Scale ([facility] Only) 0 04/21/21 2000          Peripheral IV 04/18/21 20 G Right Antecubital (Active)   Site Assessment Clean;Dry;Intact 04/21/21 2000   Dressing Type Occlusive;Transparent 04/21/21 2000   Line Status Infusing 04/21/21 2000   Dressing Status Clean;Dry;Intact 04/21/21 2000   Dressing Intervention N/A 04/21/21 2000   Dressing Change Due 04/24/21 04/19/21 0400   Date Primary Tubing Changed 04/18/21 04/18/21 2100   NEXT Primary Tubing Change  04/20/32 04/18/21 0800   Infiltration Grading ([facility] 0 04/21/21 2000   Phlebitis Scale ([facility] Only) 0 04/21/21 2000               MENTAL STATUS ON  TRANSFER             CONSULTATIONS  Psychiatry    PROCEDURES  None    LABORATORY  Lab Results   Component Value Date    SODIUM 133 (L) 04/21/2021    POTASSIUM 3.6 04/21/2021    CHLORIDE 101 04/21/2021    CO2 28 04/21/2021    GLUCOSE 94 04/21/2021    BUN 7 (L) 04/21/2021    CREATININE 0.60 04/21/2021        Lab Results   Component Value Date    WBC 2.9 (L) 04/22/2021    HEMOGLOBIN 9.3 (L) 04/22/2021    HEMATOCRIT 29.5 (L) 04/22/2021    PLATELETCT 93 (L) 04/22/2021      CT-HEAD W/O   Final Result         1.  No acute intracranial abnormality is identified, there are nonspecific white matter changes, commonly associated with small vessel ischemic disease.  Associated mild cerebral atrophy is noted.   2.  Hyperdensity in the posterior oropharynx, likely ingested material. Both   3.  Atherosclerosis.      DX-CHEST-PORTABLE (1 VIEW)   Final Result         1.  Interstitial pulmonary parenchymal prominence suggest chronic underlying lung disease, component of interstitial edema and/or infiltrates not excluded.   2.  Cardiomegaly   3.  Atherosclerosis          Total time of the discharge process exceeds 45 minutes.

## 2021-04-22 NOTE — PROGRESS NOTES
Notified pts son Eric regarding patients pending transfer to [facility]. All questions answered and concerns addressed.

## 2021-06-25 ENCOUNTER — HOSPITAL ENCOUNTER (EMERGENCY)
Facility: MEDICAL CENTER | Age: 61
End: 2021-06-25
Attending: EMERGENCY MEDICINE
Payer: COMMERCIAL

## 2021-06-25 VITALS
OXYGEN SATURATION: 98 % | TEMPERATURE: 97 F | RESPIRATION RATE: 16 BRPM | HEART RATE: 75 BPM | WEIGHT: 137 LBS | SYSTOLIC BLOOD PRESSURE: 95 MMHG | BODY MASS INDEX: 25.06 KG/M2 | DIASTOLIC BLOOD PRESSURE: 54 MMHG

## 2021-06-25 DIAGNOSIS — Z59.00 HOMELESSNESS: ICD-10-CM

## 2021-06-25 DIAGNOSIS — F10.10 ALCOHOL ABUSE: ICD-10-CM

## 2021-06-25 PROCEDURE — 99284 EMERGENCY DEPT VISIT MOD MDM: CPT

## 2021-06-25 RX ORDER — ACETAMINOPHEN 325 MG/1
650 TABLET ORAL ONCE
Status: DISCONTINUED | OUTPATIENT
Start: 2021-06-25 | End: 2021-06-25 | Stop reason: HOSPADM

## 2021-06-25 SDOH — ECONOMIC STABILITY - HOUSING INSECURITY: HOMELESSNESS UNSPECIFIED: Z59.00

## 2021-06-25 ASSESSMENT — FIBROSIS 4 INDEX: FIB4 SCORE: 5.67

## 2021-06-25 NOTE — ED TRIAGE NOTES
Chief Complaint   Patient presents with   • Homeless     [facility] EMS brought pt to ED. Denies SI/HI. No trauma noted.      Pt BIB EMS for above c/o. GCS 15, a/o x 4.     BP (!) 91/54   Pulse 77   Temp 35.8 °C (96.5 °F) (Temporal)   Resp 18   SpO2 98%

## 2021-06-25 NOTE — ED NOTES
Pt came out of room ripping monitoring equipment off the wall and yelling at the [facility] sitters. This RN helped the pt back into the room where the pt continue to yell at this RN.

## 2021-06-25 NOTE — DISCHARGE PLANNING
MSW was updated by bedside RN. Pt's son is getting her an [employer] ticket home. MSW provided pt with cab voucher #[id number] to [employer].

## 2021-06-25 NOTE — ED NOTES
Pt resting comfortably. VSS Stable appears in no acute distress. Denies any additional needs.Call light within reach. Arouses to verbal stimuli. Breakfast provided.

## 2021-06-25 NOTE — ED NOTES
Understanding of dc paperwork. Steady gait with cane to waiting room. Waiting for taxi. All belongings with pt to waiting room

## 2021-06-25 NOTE — ED NOTES
Pt given Hot TV dinner and water per request. Pt sleeping but aware food and water bedside. Pulse ox and BP cuff on pt for monitoring purposes.

## 2021-06-25 NOTE — ED PROVIDER NOTES
ER Provider Note     Scribed for Rudolph Gonzalez M.D. by Christopher Pritchard. 2021, 3:01 AM.    Primary Care Provider: No primary care provider on file.  Means of Arrival: EMS   History obtained from: Patient  History limited by: None    CHIEF COMPLAINT  Chief Complaint   Patient presents with    Homeless     [facility]. EMS brought pt to ED. Denies SI/HI. No trauma noted.      HPI  Kacie Aceves is a 61 y.o. female who presents to the Emergency Department via EMS for alcohol intoxication. The patient states that she was kicked out of a women's shelter for alcohol intoxication and brought here by EMS. Denies any SI/HI. No other complaints noted.     REVIEW OF SYSTEMS  See HPI for further details. All other systems are negative.     PAST MEDICAL HISTORY   has a past medical history of Cirrhosis with alcoholism (HCC), COPD (chronic obstructive pulmonary disease) (HCC), GERD (gastroesophageal reflux disease), Heart disease, Hypertension, and Scoliosis.    SURGICAL HISTORY   has a past surgical history that includes cholecystectomy and primary c section.    SOCIAL HISTORY  Social History     Tobacco Use    Smoking status: Former Smoker     Packs/day: 0.75     Years: 35.00     Pack years: 26.25     Types: Cigarettes     Quit date:      Years since quittin.4    Smokeless tobacco: Never Used   Vaping Use    Vaping Use: Unknown   Substance Use Topics    Alcohol use: Yes     Comment: 1 pint vodka daily    Drug use: Not Currently     Comment: quit meth and heroin 2021      Social History     Substance and Sexual Activity   Drug Use Not Currently    Comment: quit meth and heroin 2021       FAMILY HISTORY  Family History   Problem Relation Age of Onset    COPD Mother     Heart Disease Father     Kidney Disease Father        CURRENT MEDICATIONS  Home Medications    **Home medications have not yet been reviewed for this encounter**         ALLERGIES  No Known Allergies    PHYSICAL  EXAM  VITAL SIGNS: BP (!) 91/54   Pulse 77   Temp 35.8 °C (96.5 °F) (Temporal)   Resp 18   SpO2 98%    Constitutional: Alert. Tearful.  HENT: No signs of trauma, Bilateral external ears normal, Nose normal.   Eyes: Pupils are equal and reactive, Conjunctiva normal, Non-icteric.   Cardiovascular: Regular rate and rhythm, no palpable thrill  Thorax & Lungs: No respiratory distress,  No chest tenderness.   Abdomen: Bowel sounds normal, Soft, No tenderness, No masses, No pulsatile masses. No peritoneal signs.  Skin: Warm, Dry, No erythema, No rash.   Musculoskeletal: Good range of motion in all major joints. No tenderness to palpation or major deformities noted.   Neurologic: Alert , Normal motor function, Normal sensory function, No focal deficits noted.     COURSE & MEDICAL DECISION MAKING  Pertinent Labs & Imaging studies reviewed. (See chart for details)    This is a 61 y.o. female that presents coming here after being kicked out of the homeless shelter.  She had been drinking alcohol.  At this time we will let her rest until daylight hours.  We will discharge her then.  She is not clinically intoxicated at this time..     3:01 AM - Patient seen and examined at bedside. Will contact  to assist her and provide her with food and water.    The patient will return for new or worsening symptoms and is stable at the time of discharge.    The patient is referred to a primary physician for blood pressure management, diabetic screening, and for all other preventative health concerns.    DISPOSITION:  Patient will be discharged home in stable condition.    FOLLOW UP:  [address] [address] [phone]  Go in 2 days        OUTPATIENT MEDICATIONS:  New Prescriptions    No medications on file       FINAL IMPRESSION  No diagnosis found.      I, Christopher Pritchard (Scribe), am scribing for, and in the presence of, Rudolph Gonzalez M.D..    Electronically signed by: Christopher Pritchard  (Scribe), 6/25/2021    IRudolph M.D. personally performed the services described in this documentation, as scribed by Christopher Pritchard in my presence, and it is both accurate and complete.     The note accurately reflects work and decisions made by me.  Rudolph Gonzalez M.D.  6/25/2021  4:24 AM

## 2021-06-25 NOTE — ED NOTES
Pt yelling at ED staff demanding water and food. This RN explained to pt that he would get her food and water. Pt ambulatory without assistance with steady gait.

## 2024-12-29 NOTE — CARE PLAN
CASE MANAGEMENT DISCHARGE SUMMARY      Discharge to: [facility]    Precertification completed: NA LT    Hospital Exemption Notification (HENS) completed: NA - LT    IMM given: 12/29/2024        Transportation:      Medical Transport explained to pt/family. Pt/family voice no agency preference.      Agency used: [facility]     time: 1500     Ambulance form completed: Yes    Confirmed discharge plan with:     Patient: yes     Family:  yes- called wife     Facility/Agency, name:  [facility]     Phone number for report to facility: [phone]     Francisco Turner T/Th/Sat     RN name: Matilde MORFIN    Note: Discharging nurse to complete CELINE, reconcile AVS, and place final copy with patient's discharge packet. RN to ensure that written prescriptions for  Level II medications are sent with patient to the facility as per protocol.           Problem: Safety  Goal: Will remain free from injury  Outcome: PROGRESSING AS EXPECTED  Note: Patient educated to use call light for assistance. Fall precautions in place. Staff will assist with mobilization. Hourly rounding in place.      Problem: Psychosocial Needs:  Goal: Level of anxiety will decrease  Outcome: PROGRESSING AS EXPECTED  Intervention: Identify and develop with patient strategies to cope with anxiety triggers  Note: Minimized interruptions to encourage rest and sleep. Assessed client for level of anxiety. Patient encouraged to perform deep breathing exercise to encourage rest and provide relief from anxiety.